# Patient Record
Sex: MALE | Race: WHITE | Employment: FULL TIME | URBAN - METROPOLITAN AREA
[De-identification: names, ages, dates, MRNs, and addresses within clinical notes are randomized per-mention and may not be internally consistent; named-entity substitution may affect disease eponyms.]

---

## 2019-05-22 ENCOUNTER — OFFICE VISIT (OUTPATIENT)
Dept: INTERNAL MEDICINE | Facility: CLINIC | Age: 19
End: 2019-05-22
Payer: COMMERCIAL

## 2019-05-22 VITALS
HEART RATE: 96 BPM | OXYGEN SATURATION: 99 % | SYSTOLIC BLOOD PRESSURE: 119 MMHG | WEIGHT: 123 LBS | HEIGHT: 73 IN | RESPIRATION RATE: 18 BRPM | TEMPERATURE: 98.2 F | BODY MASS INDEX: 16.3 KG/M2 | DIASTOLIC BLOOD PRESSURE: 74 MMHG

## 2019-05-22 DIAGNOSIS — Z00.00 ROUTINE HISTORY AND PHYSICAL EXAMINATION OF ADULT: Primary | ICD-10-CM

## 2019-05-22 DIAGNOSIS — F41.9 ANXIETY: ICD-10-CM

## 2019-05-22 LAB
BASOPHILS # BLD AUTO: 0 10E9/L (ref 0–0.2)
BASOPHILS NFR BLD AUTO: 0.2 %
DIFFERENTIAL METHOD BLD: NORMAL
EOSINOPHIL # BLD AUTO: 0 10E9/L (ref 0–0.7)
EOSINOPHIL NFR BLD AUTO: 0.3 %
ERYTHROCYTE [DISTWIDTH] IN BLOOD BY AUTOMATED COUNT: 12.1 % (ref 10–15)
HCT VFR BLD AUTO: 45.6 % (ref 40–53)
HGB BLD-MCNC: 15.6 G/DL (ref 13.3–17.7)
LYMPHOCYTES # BLD AUTO: 1.8 10E9/L (ref 0.8–5.3)
LYMPHOCYTES NFR BLD AUTO: 28.1 %
MCH RBC QN AUTO: 29 PG (ref 26.5–33)
MCHC RBC AUTO-ENTMCNC: 34.2 G/DL (ref 31.5–36.5)
MCV RBC AUTO: 85 FL (ref 78–100)
MONOCYTES # BLD AUTO: 0.4 10E9/L (ref 0–1.3)
MONOCYTES NFR BLD AUTO: 6.5 %
NEUTROPHILS # BLD AUTO: 4.2 10E9/L (ref 1.6–8.3)
NEUTROPHILS NFR BLD AUTO: 64.9 %
PLATELET # BLD AUTO: 189 10E9/L (ref 150–450)
RBC # BLD AUTO: 5.38 10E12/L (ref 4.4–5.9)
WBC # BLD AUTO: 6.5 10E9/L (ref 4–11)

## 2019-05-22 PROCEDURE — 99395 PREV VISIT EST AGE 18-39: CPT | Performed by: FAMILY MEDICINE

## 2019-05-22 PROCEDURE — 84443 ASSAY THYROID STIM HORMONE: CPT | Performed by: FAMILY MEDICINE

## 2019-05-22 PROCEDURE — 93000 ELECTROCARDIOGRAM COMPLETE: CPT | Performed by: FAMILY MEDICINE

## 2019-05-22 PROCEDURE — 85025 COMPLETE CBC W/AUTO DIFF WBC: CPT | Performed by: FAMILY MEDICINE

## 2019-05-22 PROCEDURE — 36415 COLL VENOUS BLD VENIPUNCTURE: CPT | Performed by: FAMILY MEDICINE

## 2019-05-22 PROCEDURE — 80048 BASIC METABOLIC PNL TOTAL CA: CPT | Performed by: FAMILY MEDICINE

## 2019-05-22 PROCEDURE — 80061 LIPID PANEL: CPT | Performed by: FAMILY MEDICINE

## 2019-05-22 SDOH — HEALTH STABILITY: MENTAL HEALTH: HOW OFTEN DO YOU HAVE A DRINK CONTAINING ALCOHOL?: NEVER

## 2019-05-22 ASSESSMENT — ANXIETY QUESTIONNAIRES
2. NOT BEING ABLE TO STOP OR CONTROL WORRYING: NOT AT ALL
IF YOU CHECKED OFF ANY PROBLEMS ON THIS QUESTIONNAIRE, HOW DIFFICULT HAVE THESE PROBLEMS MADE IT FOR YOU TO DO YOUR WORK, TAKE CARE OF THINGS AT HOME, OR GET ALONG WITH OTHER PEOPLE: NOT DIFFICULT AT ALL
6. BECOMING EASILY ANNOYED OR IRRITABLE: SEVERAL DAYS
GAD7 TOTAL SCORE: 5
5. BEING SO RESTLESS THAT IT IS HARD TO SIT STILL: SEVERAL DAYS
3. WORRYING TOO MUCH ABOUT DIFFERENT THINGS: SEVERAL DAYS
7. FEELING AFRAID AS IF SOMETHING AWFUL MIGHT HAPPEN: SEVERAL DAYS
1. FEELING NERVOUS, ANXIOUS, OR ON EDGE: SEVERAL DAYS

## 2019-05-22 ASSESSMENT — MIFFLIN-ST. JEOR: SCORE: 1613.86

## 2019-05-22 ASSESSMENT — ENCOUNTER SYMPTOMS
DIZZINESS: 0
WEAKNESS: 0
HEADACHES: 0
HEMATURIA: 0
HEMATOCHEZIA: 0
DIARRHEA: 0
CHILLS: 0
HEARTBURN: 0
NERVOUS/ANXIOUS: 0
FREQUENCY: 0
SHORTNESS OF BREATH: 1
NAUSEA: 0
SORE THROAT: 0
EYE PAIN: 0
JOINT SWELLING: 0
CONSTIPATION: 0
PALPITATIONS: 1
ABDOMINAL PAIN: 1
ARTHRALGIAS: 0
PARESTHESIAS: 0
MYALGIAS: 0
FEVER: 0
DYSURIA: 0
COUGH: 1

## 2019-05-22 ASSESSMENT — PATIENT HEALTH QUESTIONNAIRE - PHQ9
5. POOR APPETITE OR OVEREATING: NOT AT ALL
10. IF YOU CHECKED OFF ANY PROBLEMS, HOW DIFFICULT HAVE THESE PROBLEMS MADE IT FOR YOU TO DO YOUR WORK, TAKE CARE OF THINGS AT HOME, OR GET ALONG WITH OTHER PEOPLE: NOT DIFFICULT AT ALL
SUM OF ALL RESPONSES TO PHQ QUESTIONS 1-9: 7
SUM OF ALL RESPONSES TO PHQ QUESTIONS 1-9: 7

## 2019-05-22 NOTE — PROGRESS NOTES
SUBJECTIVE:   CC: Kev Espinal is an 18 year old male who presents for preventative health visit.     Healthy Habits:     Getting at least 3 servings of Calcium per day:  NO    Bi-annual eye exam:  NO    Dental care twice a year:  NO    Sleep apnea or symptoms of sleep apnea:  None    Diet:  Breakfast skipped    Frequency of exercise:  None    Taking medications regularly:  Not Applicable    Medication side effects:  Not applicable    PHQ-2 Total Score: 3    Additional concerns today:  Yes      He wonders about anxiety spells - Had a couple of them on May 20.  Feels shaky, hot, chest tight, fast heart rate.  Duration of spells 15 to 30 minutes.  He has not described these on other days so it is a relatively new phenomenon.      Patient Active Problem List   Diagnosis     Scoliosis     Lordosis of thoracolumbar region           Today's PHQ-2 Score:   PHQ-2 ( 1999 Pfizer) 5/22/2019   Q1: Little interest or pleasure in doing things 2   Q2: Feeling down, depressed or hopeless 1   PHQ-2 Score 3   Q1: Little interest or pleasure in doing things More than half the days   Q2: Feeling down, depressed or hopeless Several days   PHQ-2 Score 3       Abuse: Current or Past(Physical, Sexual or Emotional)- No  Do you feel safe in your environment? Yes    Social History     Tobacco Use     Smoking status: Current Some Day Smoker     Smokeless tobacco: Never Used     Tobacco comment: No one in family smokes.   Substance Use Topics     Alcohol use: Never     Frequency: Never     If you drink alcohol do you typically have >3 drinks per day or >7 drinks per week? 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      Alcohol Use 5/22/2019   Prescreen: >3 drinks/day or >7 drinks/week? Not Applicable   No flowsheet data found.    Last PSA: No results found for: PSA    Reviewed orders with patient. Reviewed health maintenance and updated orders accordingly - Yes      Reviewed and updated as needed this visit by clinical staff  Tobacco   "Allergies  Meds  Med Hx  Surg Hx  Fam Hx  Soc Hx        Reviewed and updated as needed this visit by Provider            Review of Systems   Constitutional: Negative for chills and fever.   HENT: Negative for congestion, ear pain, hearing loss and sore throat.    Eyes: Negative for pain and visual disturbance.   Respiratory: Positive for cough and shortness of breath.    Cardiovascular: Positive for palpitations. Negative for chest pain and peripheral edema.   Gastrointestinal: Positive for abdominal pain. Negative for constipation, diarrhea, heartburn, hematochezia and nausea.   Genitourinary: Negative for discharge, dysuria, frequency, genital sores, hematuria, impotence and urgency.   Musculoskeletal: Negative for arthralgias, joint swelling and myalgias.   Skin: Negative for rash.   Neurological: Negative for dizziness, weakness, headaches and paresthesias.   Psychiatric/Behavioral: Negative for mood changes. The patient is not nervous/anxious.          OBJECTIVE:   /74 (BP Location: Right arm, Patient Position: Sitting, Cuff Size: Adult Regular)   Pulse 96   Temp 98.2  F (36.8  C) (Oral)   Resp 18   Ht 1.842 m (6' 0.5\")   Wt 55.8 kg (123 lb)   SpO2 99%   BMI 16.45 kg/m      Physical Exam  GENERAL: healthy, alert and no distress  EYES: Eyes grossly normal to inspection, PERRL and conjunctivae and sclerae normal  HENT: ear canals and TM's normal, nose and mouth without ulcers or lesions  NECK: no adenopathy, no asymmetry, masses, or scars and thyroid normal to palpation  RESP: lungs clear to auscultation - no rales, rhonchi or wheezes  CV: regular rate and rhythm, normal S1 S2, no S3 or S4, no murmur, click or rub, no peripheral edema and peripheral pulses strong  ABDOMEN: soft, nontender, no hepatosplenomegaly, no masses and bowel sounds normal   (male): normal male genitalia without lesions or urethral discharge, no hernia  MS: no gross musculoskeletal defects noted, no edema  SKIN: no " "suspicious lesions or rashes  NEURO: Normal strength and tone, mentation intact and speech normal  PSYCH: mentation appears normal, affect normal/bright    Diagnostic Test Results:  Labs reviewed in Epic      EKG:  NSR, rate 68  Conduction nl, axis nl  ST and T nl  No Q wave, no ectopy.  EKG is WNL, no previous.        ASSESSMENT/PLAN:   1. Routine history and physical examination of adult    - Basic metabolic panel  (Ca, Cl, CO2, Creat, Gluc, K, Na, BUN)  - CBC with platelets and differential  - TSH  - Lipid Profile (Chol, Trig, HDL, LDL calc)  - EKG 12-lead complete w/read - Clinics    2. Anxiety    Consider monitor if heart racing spells persist.    Could consider anti anxiety med if persists and no other obvious cause.    Patient advised to return for worsening or persistent symptoms.      COUNSELING:   Reviewed preventive health counseling, as reflected in patient instructions       Consider AAA screening for ages 65-75 and smoking history       Regular exercise       Healthy diet/nutrition       Vision screening    Estimated body mass index is 16.45 kg/m  as calculated from the following:    Height as of this encounter: 1.842 m (6' 0.5\").    Weight as of this encounter: 55.8 kg (123 lb).          reports that he has been smoking.  He has never used smokeless tobacco.      Counseling Resources:  ATP IV Guidelines  Pooled Cohorts Equation Calculator  FRAX Risk Assessment  ICSI Preventive Guidelines  Dietary Guidelines for Americans, 2010  USDA's MyPlate  ASA Prophylaxis  Lung CA Screening    Guero Flores MD  [facility]  Answers for HPI/ROS submitted by the patient on 5/22/2019   Annual Exam:  If you checked off any problems, how difficult have these problems made it for you to do your work, take care of things at home, or get along with other people?: Not difficult at all  PHQ9 TOTAL SCORE: 7    "

## 2019-05-22 NOTE — LETTER
May 22, 2019        Kev Espinal        EKG looks OK.    Lab workup in progress.    Could consider a heart monitor if racing heart spells continue.    Talked about anxiety.          Guero Flores MD on 5/22/2019 at 1:47 PM

## 2019-05-23 LAB
ANION GAP SERPL CALCULATED.3IONS-SCNC: 6 MMOL/L (ref 3–14)
BUN SERPL-MCNC: 16 MG/DL (ref 7–30)
CALCIUM SERPL-MCNC: 10 MG/DL (ref 8.5–10.1)
CHLORIDE SERPL-SCNC: 105 MMOL/L (ref 94–109)
CHOLEST SERPL-MCNC: 156 MG/DL
CO2 SERPL-SCNC: 26 MMOL/L (ref 20–32)
CREAT SERPL-MCNC: 0.88 MG/DL (ref 0.66–1.25)
GFR SERPL CREATININE-BSD FRML MDRD: >90 ML/MIN/{1.73_M2}
GLUCOSE SERPL-MCNC: 73 MG/DL (ref 70–99)
HDLC SERPL-MCNC: 52 MG/DL
LDLC SERPL CALC-MCNC: 88 MG/DL
NONHDLC SERPL-MCNC: 104 MG/DL
POTASSIUM SERPL-SCNC: 4 MMOL/L (ref 3.4–5.3)
SODIUM SERPL-SCNC: 137 MMOL/L (ref 133–144)
TRIGL SERPL-MCNC: 80 MG/DL
TSH SERPL DL<=0.005 MIU/L-ACNC: 1.49 MU/L (ref 0.4–4)

## 2019-05-23 ASSESSMENT — ANXIETY QUESTIONNAIRES: GAD7 TOTAL SCORE: 5

## 2019-06-20 ENCOUNTER — OFFICE VISIT (OUTPATIENT)
Dept: INTERNAL MEDICINE | Facility: CLINIC | Age: 19
End: 2019-06-20
Payer: COMMERCIAL

## 2019-06-20 VITALS
HEART RATE: 123 BPM | WEIGHT: 123.1 LBS | SYSTOLIC BLOOD PRESSURE: 118 MMHG | BODY MASS INDEX: 16.32 KG/M2 | HEIGHT: 73 IN | DIASTOLIC BLOOD PRESSURE: 62 MMHG | TEMPERATURE: 98.6 F | OXYGEN SATURATION: 97 % | RESPIRATION RATE: 12 BRPM

## 2019-06-20 DIAGNOSIS — F41.9 ANXIETY: Primary | ICD-10-CM

## 2019-06-20 PROCEDURE — 99213 OFFICE O/P EST LOW 20 MIN: CPT | Performed by: NURSE PRACTITIONER

## 2019-06-20 ASSESSMENT — MIFFLIN-ST. JEOR: SCORE: 1614.32

## 2019-06-20 NOTE — PROGRESS NOTES
Subjective     Kev Espinal is a 18 year old male who presents to clinic today for the following health issues:    HPI   Abnormal Mood Symptoms      Duration: past week    Description:  Depression: no  Anxiety: YES  Panic attacks: YES- wakes up in panic state     Accompanying signs and symptoms: see PHQ-9 and JUDI scores    History (similar episodes/previous evaluation): last month PE with normal EKG, labs.  UC last week normal CXR, recommended anxiety treatment    Precipitating or alleviating factors: None    Therapies tried and outcome: none        Patient Active Problem List   Diagnosis     Scoliosis     Lordosis of thoracolumbar region     History reviewed. No pertinent surgical history.    Social History     Tobacco Use     Smoking status: Former Smoker     Last attempt to quit: 2019     Years since quittin.0     Smokeless tobacco: Never Used     Tobacco comment: No one in family smokes.   Substance Use Topics     Alcohol use: Not Currently     Frequency: Never     Family History   Problem Relation Age of Onset     Cancer - colorectal Maternal Grandmother      Diabetes Paternal Grandmother      Asthma Sister          Current Outpatient Medications   Medication Sig Dispense Refill     sertraline (ZOLOFT) 50 MG tablet Take 1 tablet (50 mg) by mouth daily 90 tablet 1     BP Readings from Last 3 Encounters:   19 118/62   19 119/74   14 104/62 (14 %/ 29 %)*     *BP percentiles are based on the 2017 AAP Clinical Practice Guideline for boys    Wt Readings from Last 3 Encounters:   19 55.8 kg (123 lb 1.6 oz)   19 55.8 kg (123 lb)   14 54.5 kg (120 lb 3.2 oz) (29 %)*     * Growth percentiles are based on CDC (Boys, 2-20 Years) data.                    Reviewed and updated as needed this visit by Provider         Review of Systems   ROS COMP: CONSTITUTIONAL: NEGATIVE for fever, chills, change in weight  ENT/MOUTH: NEGATIVE for ear, mouth and throat problems  RESP:  "NEGATIVE for significant cough or SOB  CV: POSITIVE for chest pain/chest pressure and palpitations      Objective    /62   Pulse 123   Temp 98.6  F (37  C) (Oral)   Resp 12   Ht 1.842 m (6' 0.5\")   Wt 55.8 kg (123 lb 1.6 oz)   SpO2 97%   BMI 16.47 kg/m        Physical Exam   GENERAL: alert, no distress and tall, underweight  NECK: no adenopathy, no asymmetry, masses, or scars and thyroid normal to palpation  RESP: lungs clear to auscultation - no rales, rhonchi or wheezes  CV: regular rate and rhythm, normal S1 S2, no S3 or S4, no murmur, click or rub, no peripheral edema and peripheral pulses strong  PSYCH: mentation appears normal, tangential and anxious            Assessment & Plan       ICD-10-CM    1. Anxiety F41.9 sertraline (ZOLOFT) 50 MG tablet          F/u 1 month on med          Mary Cha NP  [facility]    "

## 2020-01-12 DIAGNOSIS — F41.9 ANXIETY: ICD-10-CM

## 2020-01-13 NOTE — TELEPHONE ENCOUNTER
Sertraline refill request   Last OV on 6/20/19    Takes for anxiety     Provider approval needed.     JUDI-7 SCORE 5/22/2019   Total Score 5

## 2020-01-13 NOTE — TELEPHONE ENCOUNTER
"Requested Prescriptions   Pending Prescriptions Disp Refills     sertraline (ZOLOFT) 50 MG tablet [Pharmacy Med Name: SERTRALINE 50MG TABLETS] 90 tablet 1     Sig: TAKE 1 TABLET(50 MG) BY MOUTH DAILY   Last Written Prescription Date:  06/20/2019  Last Fill Quantity: 90,  # refills: 01   Last office visit: 6/20/2019 with prescribing provider:     Future Office Visit:      SSRIs Protocol Passed - 1/12/2020  3:27 AM        Passed - Recent (12 mo) or future (30 days) visit within the authorizing provider's specialty     Patient has had an office visit with the authorizing provider or a provider within the authorizing providers department within the previous 12 mos or has a future within next 30 days. See \"Patient Info\" tab in inbasket, or \"Choose Columns\" in Meds & Orders section of the refill encounter.              Passed - Medication is active on med list        Passed - Patient is age 18 or older        "

## 2020-02-24 ENCOUNTER — TRANSFERRED RECORDS (OUTPATIENT)
Dept: HEALTH INFORMATION MANAGEMENT | Facility: CLINIC | Age: 20
End: 2020-02-24

## 2020-02-24 ENCOUNTER — HOSPITAL ENCOUNTER (EMERGENCY)
Facility: CLINIC | Age: 20
Discharge: HOME | End: 2020-02-24
Attending: EMERGENCY MEDICINE | Admitting: EMERGENCY MEDICINE
Payer: COMMERCIAL

## 2020-02-24 VITALS
RESPIRATION RATE: 16 BRPM | DIASTOLIC BLOOD PRESSURE: 80 MMHG | TEMPERATURE: 98.5 F | HEART RATE: 94 BPM | SYSTOLIC BLOOD PRESSURE: 144 MMHG | OXYGEN SATURATION: 100 %

## 2020-02-24 DIAGNOSIS — R07.89 CHEST WALL PAIN: ICD-10-CM

## 2020-02-24 DIAGNOSIS — R07.9 CHEST PAIN, UNSPECIFIED TYPE: ICD-10-CM

## 2020-02-24 LAB
ALBUMIN SERPL-MCNC: 4.5 G/DL (ref 3.4–5)
ALP SERPL-CCNC: 77 U/L (ref 40–150)
ALT SERPL W P-5'-P-CCNC: 23 U/L (ref 0–70)
ANION GAP SERPL CALCULATED.3IONS-SCNC: 5 MMOL/L (ref 3–14)
AST SERPL W P-5'-P-CCNC: 13 U/L (ref 0–45)
BASOPHILS # BLD AUTO: 0 10E9/L (ref 0–0.2)
BASOPHILS NFR BLD AUTO: 0.3 %
BILIRUB SERPL-MCNC: 0.7 MG/DL (ref 0.2–1.3)
BUN SERPL-MCNC: 8 MG/DL (ref 7–30)
CALCIUM SERPL-MCNC: 9.2 MG/DL (ref 8.5–10.1)
CHLORIDE SERPL-SCNC: 106 MMOL/L (ref 94–109)
CO2 SERPL-SCNC: 28 MMOL/L (ref 20–32)
CREAT SERPL-MCNC: 0.83 MG/DL (ref 0.66–1.25)
DIFFERENTIAL METHOD BLD: NORMAL
EOSINOPHIL # BLD AUTO: 0 10E9/L (ref 0–0.7)
EOSINOPHIL NFR BLD AUTO: 0.4 %
ERYTHROCYTE [DISTWIDTH] IN BLOOD BY AUTOMATED COUNT: 11.7 % (ref 10–15)
GFR SERPL CREATININE-BSD FRML MDRD: >90 ML/MIN/{1.73_M2}
GLUCOSE SERPL-MCNC: 85 MG/DL (ref 70–99)
HCT VFR BLD AUTO: 44.4 % (ref 40–53)
HGB BLD-MCNC: 15.3 G/DL (ref 13.3–17.7)
IMM GRANULOCYTES # BLD: 0 10E9/L (ref 0–0.4)
IMM GRANULOCYTES NFR BLD: 0.4 %
LYMPHOCYTES # BLD AUTO: 1.6 10E9/L (ref 0.8–5.3)
LYMPHOCYTES NFR BLD AUTO: 21.6 %
MCH RBC QN AUTO: 29.9 PG (ref 26.5–33)
MCHC RBC AUTO-ENTMCNC: 34.5 G/DL (ref 31.5–36.5)
MCV RBC AUTO: 87 FL (ref 78–100)
MONOCYTES # BLD AUTO: 0.5 10E9/L (ref 0–1.3)
MONOCYTES NFR BLD AUTO: 7 %
NEUTROPHILS # BLD AUTO: 5.2 10E9/L (ref 1.6–8.3)
NEUTROPHILS NFR BLD AUTO: 70.3 %
NRBC # BLD AUTO: 0 10*3/UL
NRBC BLD AUTO-RTO: 0 /100
PLATELET # BLD AUTO: 176 10E9/L (ref 150–450)
POTASSIUM SERPL-SCNC: 3.6 MMOL/L (ref 3.4–5.3)
PROT SERPL-MCNC: 7.9 G/DL (ref 6.8–8.8)
RBC # BLD AUTO: 5.12 10E12/L (ref 4.4–5.9)
SODIUM SERPL-SCNC: 139 MMOL/L (ref 133–144)
WBC # BLD AUTO: 7.4 10E9/L (ref 4–11)

## 2020-02-24 PROCEDURE — 99285 EMERGENCY DEPT VISIT HI MDM: CPT

## 2020-02-24 PROCEDURE — 85025 COMPLETE CBC W/AUTO DIFF WBC: CPT | Performed by: EMERGENCY MEDICINE

## 2020-02-24 PROCEDURE — 36415 COLL VENOUS BLD VENIPUNCTURE: CPT | Performed by: EMERGENCY MEDICINE

## 2020-02-24 PROCEDURE — 93005 ELECTROCARDIOGRAM TRACING: CPT

## 2020-02-24 PROCEDURE — 80053 COMPREHEN METABOLIC PANEL: CPT | Performed by: EMERGENCY MEDICINE

## 2020-02-24 PROCEDURE — 25000132 ZZH RX MED GY IP 250 OP 250 PS 637: Performed by: EMERGENCY MEDICINE

## 2020-02-24 RX ORDER — ACETAMINOPHEN 325 MG/1
975 TABLET ORAL ONCE
Status: COMPLETED | OUTPATIENT
Start: 2020-02-24 | End: 2020-02-24

## 2020-02-24 RX ADMIN — ACETAMINOPHEN 975 MG: 325 TABLET, FILM COATED ORAL at 16:49

## 2020-02-24 ASSESSMENT — ENCOUNTER SYMPTOMS
TROUBLE SWALLOWING: 0
SPEECH DIFFICULTY: 0
NUMBNESS: 1
ARTHRALGIAS: 1

## 2020-02-24 NOTE — ED AVS SNAPSHOT
[facility] Emergency Department  [address]  [facility] [address]  Phone:  [phone]  Fax:  [phone]                                    Kev Espinal   MRN: [id number]    Department:  [facility] Emergency Department   Date of Visit:  2/24/2020           After Visit Summary Signature Page    I have received my discharge instructions, and my questions have been answered. I have discussed any challenges I see with this plan with the nurse or doctor.    ..........................................................................................................................................  Patient/Patient Representative Signature      ..........................................................................................................................................  Patient Representative Print Name and Relationship to Patient    ..................................................               ................................................  Date                                   Time    ..........................................................................................................................................  Reviewed by Signature/Title    ...................................................              ..............................................  Date                                               Time          22EPIC Rev 08/18

## 2020-02-24 NOTE — ED PROVIDER NOTES
History     Chief Complaint:  Multiple complaints    HPI   Kev Espinal is a 19 year old male who presents to the emergency department today with multiple complaints. The patient reports chest pain on the left side, left ribs, and between the left shoulder blade and ribs for the last 8 months. He states the chest pain started after an anxiety attack he had in May. He stopped his Zoloft last month and has not noticed any change. He reports excessive burping for the last few months, face numbness for the last few months, and head numbness when he lays down. These symptoms all seem to be intermittent. He denies difficulty speaking, difficulty swallowing, balance problems. The patient sees Dr. Chery as PCP. He also saw Dr. Flores. The patient states he used to vape, but has since stopped. He has been drinking since he turned 19 in November. He used to use marijuana but does not anymore.     Cardiac/PE/DVT Risk Factors:  History of hypertension - negative   History of hyperlipidemia - negative   History of diabetes - negative   History of smoking - used to vape, denies smoking now  Personal history of PE/DVT - negative   Family history of PE/DVT - negative   Family history of heart complications - negative   Recent travel - negative   Recent surgery - negative   Other immobilizations - negative   Cancer - negative      Allergies:  Cats  Pollen Extract     Medications:    Zoloft      Past Medical History:    Scoliosis   Lordosis of thoracolumbar region     Past Surgical History:    History reviewed. No pertinent past surgical history.    Family History:    Grandmother - colorectal cancer, diabetes  Sister - asthma     Social History:  The patient was accompanied to the ED by father.  Smoking Status: Former  Smokeless Tobacco: Never  Alcohol Use: Yes   Marital Status:  Single    Review of Systems   HENT: Negative for trouble swallowing.    Cardiovascular: Positive for chest pain.   Musculoskeletal: Positive for  arthralgias (left rib and left shoulder pain). Negative for gait problem.   Neurological: Positive for numbness (face and head numbness). Negative for speech difficulty.   All other systems reviewed and are negative.        Physical Exam     Patient Vitals for the past 24 hrs:   BP Temp Temp src Pulse Resp SpO2   02/24/20 1810 -- -- -- -- 16 --   02/24/20 1609 (!) 144/80 98.5  F (36.9  C) Temporal 94 16 100 %      Physical Exam  GEN: alert. Dis-sheveled.     HEAD: atraumatic    EYES: pupils reactive, extraocular muscles intact, conjunctivae normal    ENT: TMs normal as are EACs; nares patent; normal posterior pharynx and oral mucosa    NECK: no posterior midline tenderness, no meningeal signs, trachea midline     RESPIRATORY: no tachypnea, breath sounds clear to auscultation. Lungs are clear. Prominent AP dimension.      CVS: normal S1/S2, no murmurs/rubs/gallops. Chest pain reproducible over the left hemothorax in the 5th and 6th intracostal space in the anterior axillary line.    ABDOMEN: soft, nontender, no masses or organomegaly, no rebound, positive bowel sounds    MUSCULOSKELETAL: no deformities    SKIN: warm and dry, no acute rashes or ulceration, no erythema     NEURO: Normal neurologic exam. GCS 15, cranial nerves intact.  Motor and sensory- good tone; normal gait and coordination    LYMPH: no lymphadenopathy     Emergency Department Course   ECG:  Indication: chest pain  Completed at 1749.  Read at 1754.   Normal sinus rhythm with sinus arrhythmia  Normal ECG    Rate 72 bpm. CT interval 142. QRS duration 82. QT/QTc 370/405. P-R-T axes 29 59 61.      Laboratory:  Laboratory findings were communicated with the patient and family who voiced understanding of the findings.  CBC: AWNL (WBC 7.4, HGB 15.3, )   CMP: AWNL (Creatinine 0.83)      Interventions:  1649: Tylenol 975 mg PO     Emergency Department Course:  Nursing notes and vitals reviewed.  1630: I performed an exam of the patient as documented  above.   IV was inserted and blood was drawn for laboratory testing, results above.  1756: Patient rechecked and updated.    1748: Findings and plan explained to the Patient. Patient discharged home with instructions regarding supportive care, medications, and reasons to return. The importance of close follow-up was reviewed.    I personally reviewed the laboratory results with the Patient and answered all related questions prior to discharge.     Impression & Plan    Medical Decision Making:  Kev Espinal is a 19 year old male who [facility] with chest pain of 8 month duration, which is worse with movement, worse with pushing on it, and localizes to the left side, consistent with chest wall strain or contusion. He does have a noticeable thoracic kyphosis and at this point with discussion, the father states that he was diagnosed with spina bifida as a baby. This could be contributing to the intracostal pain and rib pain that he is having on the left. I have recommended at this point Ibuprofen 600 mg PO Q8 hours PRN and follow up with Dr. Chery regarding his symptomology. He may benefit from physical therapy and working on posture. Regarding his other more vague complains such as facial and head numbness, I recommend he follow up with PCP regarding that if it becomes persistent. It sounds like he has had this off and on for months but it has not been getting worse. He has a completely normal neurological exam here and he has had no reported other issues other than that. We did discuss possible cause of his increased eructation over the past few months due to stress. He has had no abdominal pain or reflux symptoms. I am recommended that he also consult with Dr. Chery if this becomes a worsening issue or more than an annoyance.     Diagnosis:    ICD-10-CM    1. Chest pain, unspecified type R07.9    2. Chest wall pain R07.89        Disposition:  discharged to home     Scribe Disclosure:  I,  Tata Browning MD, am serving as a scribe at 4:34 PM on 2/24/2020 to document services personally performed by Win Baig MD based on my observations and the provider's statements to me.    2/24/2020   [facility] EMERGENCY DEPARTMENT       Win Baig MD  02/24/20 5450

## 2020-02-24 NOTE — ED TRIAGE NOTES
Patient sent to the ED from clinic with 8 months of intermittent left sided chest pain. Additionally reports increased belching, lateral left rib pain and occasional face and head numbness.

## 2020-02-25 LAB — INTERPRETATION ECG - MUSE: NORMAL

## 2020-03-11 ENCOUNTER — OFFICE VISIT (OUTPATIENT)
Dept: INTERNAL MEDICINE | Facility: CLINIC | Age: 20
End: 2020-03-11
Payer: COMMERCIAL

## 2020-03-11 VITALS
WEIGHT: 142.5 LBS | OXYGEN SATURATION: 99 % | HEART RATE: 107 BPM | RESPIRATION RATE: 11 BRPM | SYSTOLIC BLOOD PRESSURE: 118 MMHG | HEIGHT: 73 IN | BODY MASS INDEX: 18.89 KG/M2 | TEMPERATURE: 98.2 F | DIASTOLIC BLOOD PRESSURE: 70 MMHG

## 2020-03-11 DIAGNOSIS — M94.0 COSTOCHONDRITIS: Primary | ICD-10-CM

## 2020-03-11 DIAGNOSIS — F41.9 ANXIETY: ICD-10-CM

## 2020-03-11 PROCEDURE — 99213 OFFICE O/P EST LOW 20 MIN: CPT | Performed by: NURSE PRACTITIONER

## 2020-03-11 ASSESSMENT — MIFFLIN-ST. JEOR: SCORE: 1697.32

## 2020-03-11 NOTE — PROGRESS NOTES
Subjective     Kev Espinal is a 19 year old male who presents to clinic today for the following health issues:    HPI   ED/UC Followup:    Facility:  [facility]   Date of visit: 20  Reason for visit: chest pain chest wall pain   Current Status: still going on.  Denies SOB/ALDRIDGE/cough/wheeze.  No palpitations, CP.  Underlying anxiety, stopped zoloft as did not feel it was helpful           Patient Active Problem List   Diagnosis     Scoliosis     Lordosis of thoracolumbar region     History reviewed. No pertinent surgical history.    Social History     Tobacco Use     Smoking status: Former Smoker     Last attempt to quit: 2019     Years since quittin.7     Smokeless tobacco: Never Used     Tobacco comment: No one in family smokes.   Substance Use Topics     Alcohol use: Yes     Frequency: Never     Family History   Problem Relation Age of Onset     Cancer - colorectal Maternal Grandmother      Diabetes Paternal Grandmother      Asthma Sister          Current Outpatient Medications   Medication Sig Dispense Refill     Cholecalciferol (VITAMIN D3 PO) Take by mouth daily       sertraline (ZOLOFT) 50 MG tablet TAKE 1 TABLET(50 MG) BY MOUTH DAILY (Patient not taking: Reported on 3/11/2020) 90 tablet 1     BP Readings from Last 3 Encounters:   20 118/70   20 (!) 144/80   19 118/62    Wt Readings from Last 3 Encounters:   20 64.6 kg (142 lb 8 oz)   19 55.8 kg (123 lb 1.6 oz)   19 55.8 kg (123 lb)                    Reviewed and updated as needed this visit by Provider         Review of Systems   ROS COMP: CONSTITUTIONAL: NEGATIVE for fever, chills, change in weight  ENT/MOUTH: NEGATIVE for ear, mouth and throat problems  RESP: NEGATIVE for significant cough or SOB  CV: NEGATIVE for chest pain, palpitations or peripheral edema  GI: NEGATIVE for nausea, abdominal pain, heartburn, or change in bowel habits  PSYCHIATRIC: POSITIVE foranxiety      Objective    /70   Pulse 107   " Temp 98.2  F (36.8  C) (Oral)   Resp 11   Ht 1.842 m (6' 0.5\")   Wt 64.6 kg (142 lb 8 oz)   SpO2 99%   BMI 19.06 kg/m      Physical Exam   GENERAL: healthy, alert and no distress  NECK: no adenopathy, no asymmetry, masses, or scars and thyroid normal to palpation  RESP: lungs clear to auscultation - no rales, rhonchi or wheezes  CV: regular rate and rhythm, normal S1 S2, no S3 or S4, no murmur, click or rub, no peripheral edema and peripheral pulses strong  CV: no L chest wall pain onpalpation  ABDOMEN: soft, nontender, no hepatosplenomegaly, no masses and bowel sounds normal  PSYCH: mentation appears normal and affect normal/bright            Assessment & Plan     (M94.0) Costochondritis  (primary encounter diagnosis)  Comment:   Plan: NSAIDs, heat    (F41.9) Anxiety  Comment:   Plan:  Consider restart zoloft             Mary Cha NP  [facility]    "

## 2020-07-03 ENCOUNTER — HOSPITAL ENCOUNTER (EMERGENCY)
Facility: CLINIC | Age: 20
Discharge: HOME | End: 2020-07-03
Attending: EMERGENCY MEDICINE | Admitting: EMERGENCY MEDICINE
Payer: COMMERCIAL

## 2020-07-03 ENCOUNTER — APPOINTMENT (OUTPATIENT)
Dept: GENERAL RADIOLOGY | Facility: CLINIC | Age: 20
End: 2020-07-03
Attending: EMERGENCY MEDICINE
Payer: COMMERCIAL

## 2020-07-03 VITALS
HEART RATE: 70 BPM | TEMPERATURE: 98 F | RESPIRATION RATE: 18 BRPM | DIASTOLIC BLOOD PRESSURE: 79 MMHG | OXYGEN SATURATION: 100 % | SYSTOLIC BLOOD PRESSURE: 133 MMHG

## 2020-07-03 DIAGNOSIS — R07.9 CHEST PAIN, UNSPECIFIED TYPE: ICD-10-CM

## 2020-07-03 LAB
ANION GAP SERPL CALCULATED.3IONS-SCNC: 6 MMOL/L (ref 3–14)
BASOPHILS # BLD AUTO: 0 10E9/L (ref 0–0.2)
BASOPHILS NFR BLD AUTO: 0.3 %
BUN SERPL-MCNC: 9 MG/DL (ref 7–30)
CALCIUM SERPL-MCNC: 8.9 MG/DL (ref 8.5–10.1)
CHLORIDE SERPL-SCNC: 107 MMOL/L (ref 94–109)
CO2 SERPL-SCNC: 27 MMOL/L (ref 20–32)
CREAT SERPL-MCNC: 0.82 MG/DL (ref 0.66–1.25)
DIFFERENTIAL METHOD BLD: NORMAL
EOSINOPHIL # BLD AUTO: 0 10E9/L (ref 0–0.7)
EOSINOPHIL NFR BLD AUTO: 0.3 %
ERYTHROCYTE [DISTWIDTH] IN BLOOD BY AUTOMATED COUNT: 11.5 % (ref 10–15)
GFR SERPL CREATININE-BSD FRML MDRD: >90 ML/MIN/{1.73_M2}
GLUCOSE SERPL-MCNC: 103 MG/DL (ref 70–99)
HCT VFR BLD AUTO: 45.4 % (ref 40–53)
HGB BLD-MCNC: 15.2 G/DL (ref 13.3–17.7)
IMM GRANULOCYTES # BLD: 0 10E9/L (ref 0–0.4)
IMM GRANULOCYTES NFR BLD: 0.3 %
LYMPHOCYTES # BLD AUTO: 2 10E9/L (ref 0.8–5.3)
LYMPHOCYTES NFR BLD AUTO: 23 %
MCH RBC QN AUTO: 29.5 PG (ref 26.5–33)
MCHC RBC AUTO-ENTMCNC: 33.5 G/DL (ref 31.5–36.5)
MCV RBC AUTO: 88 FL (ref 78–100)
MONOCYTES # BLD AUTO: 0.6 10E9/L (ref 0–1.3)
MONOCYTES NFR BLD AUTO: 6.9 %
NEUTROPHILS # BLD AUTO: 6 10E9/L (ref 1.6–8.3)
NEUTROPHILS NFR BLD AUTO: 69.2 %
NRBC # BLD AUTO: 0 10*3/UL
NRBC BLD AUTO-RTO: 0 /100
PLATELET # BLD AUTO: 182 10E9/L (ref 150–450)
POTASSIUM SERPL-SCNC: 3.4 MMOL/L (ref 3.4–5.3)
RBC # BLD AUTO: 5.16 10E12/L (ref 4.4–5.9)
SODIUM SERPL-SCNC: 140 MMOL/L (ref 133–144)
TROPONIN I SERPL-MCNC: <0.015 UG/L (ref 0–0.04)
WBC # BLD AUTO: 8.6 10E9/L (ref 4–11)

## 2020-07-03 PROCEDURE — 80048 BASIC METABOLIC PNL TOTAL CA: CPT | Performed by: EMERGENCY MEDICINE

## 2020-07-03 PROCEDURE — U0003 INFECTIOUS AGENT DETECTION BY NUCLEIC ACID (DNA OR RNA); SEVERE ACUTE RESPIRATORY SYNDROME CORONAVIRUS 2 (SARS-COV-2) (CORONAVIRUS DISEASE [COVID-19]), AMPLIFIED PROBE TECHNIQUE, MAKING USE OF HIGH THROUGHPUT TECHNOLOGIES AS DESCRIBED BY CMS-2020-01-R: HCPCS | Performed by: EMERGENCY MEDICINE

## 2020-07-03 PROCEDURE — 93005 ELECTROCARDIOGRAM TRACING: CPT

## 2020-07-03 PROCEDURE — 99285 EMERGENCY DEPT VISIT HI MDM: CPT

## 2020-07-03 PROCEDURE — 85025 COMPLETE CBC W/AUTO DIFF WBC: CPT | Performed by: EMERGENCY MEDICINE

## 2020-07-03 PROCEDURE — 84484 ASSAY OF TROPONIN QUANT: CPT | Performed by: EMERGENCY MEDICINE

## 2020-07-03 PROCEDURE — 71045 X-RAY EXAM CHEST 1 VIEW: CPT

## 2020-07-03 PROCEDURE — C9803 HOPD COVID-19 SPEC COLLECT: HCPCS

## 2020-07-03 NOTE — ED TRIAGE NOTES
2 hours ago left arm began getting tingly, became nauseated and got a headache. Also reports a discomfort left side of his upper back and chest. Denies SOB or vomiting.

## 2020-07-03 NOTE — LETTER
July 4, 2020        Kev Espinal  [address] [place] [address]    This letter provides a written record that you were tested for COVID-19 on 7/3/20.       Your result was negative. This means that we didn t find the virus that causes COVID-19 in your sample. A test may show negative when you do actually have the virus. This can happen when the virus is in the early stages of infection, before you feel illness symptoms.    If you have symptoms   Stay home and away from others (self-isolate) until you meet ALL of the guidelines below:    You ve had no fever--and no medicine that reduces fever--for 3 full days (72 hours). And      Your other symptoms have gotten better. For example, your cough or breathing has improved. And     At least 10 days have passed since your symptoms started.    During this time:    Stay home. Don t go to work, school or anywhere else.     Stay in your own room, including for meals. Use your own bathroom if you can.    Stay away from others in your home. No hugging, kissing or shaking hands. No visitors.    Clean  high touch  surfaces often (doorknobs, counters, handles, etc.). Use a household cleaning spray or wipes. You can find a full list on the EPA website at www.epa.gov/pesticide-registration/list-n-disinfectants-use-against-sars-cov-2.    Cover your mouth and nose with a mask, tissue or washcloth to avoid spreading germs.    Wash your hands and face often with soap and water.    Going back to work  Check with your employer for any guidelines to follow for going back to work.    Employers: This document serves as formal notice that your employee tested negative for COVID-19, as of the testing date shown above.

## 2020-07-03 NOTE — ED PROVIDER NOTES
History     Chief Complaint:    Chest Pain    HPI   Kev Espinal is a 19 year old male with history of anxiety off medications and atypical chest pain who presents for evaluation of left arm paresthesias, sharp left-sided chest pain, nausea, and dizziness since 5 AM this morning.  He notes that he had insomnia all night, began to feel somewhat anxious, and symptoms began.  He has had symptoms such as this multiple times in the past with negative work-ups.  Of note, he does endorse some fatigue and mild dyspnea associated with the above.  He is not aware of any COVID-19 sick contacts and denies any cough, fever, or other acute concerns.  Finally, he is typically on Prilosec but has been off this until he restarted it several days ago does endorse some GERD symptoms.    Allergies:  No known drug allergies     Medications:    Cholecalciferol  Sertraline    Past Medical History:    Scoliosis  Lordosis of thoracolumbar region    Past Surgical History:    History reviewed. No pertinent surgical history.    Family History:    Asthma    Social History:  Smoking status- former smoker  Alcohol use- yes  Drug use- yes  Marital Status:  Single [1]     Review of Systems  CV: + as per above  Resp: + as per above  Neuro: + as per above  GI: + as per above  All other systems reviewed and negative      Physical Exam     Patient Vitals for the past 24 hrs:   BP Temp Pulse Heart Rate Resp SpO2   07/03/20 0710 -- 98  F (36.7  C) -- -- -- --   07/03/20 0706 133/79 -- 70 70 18 100 %       Physical Exam  Constitutional: Alert, attentive  HENT:    Nose: Nose normal.    Mouth/Throat: Oropharynx is clear, mucous membranes are moist   Eyes: EOM are normal.   CV: regular rate and rhythm; no murmurs, rubs or gallups  Chest: Effort normal and breath sounds normal.   GI:  There is no tenderness. No distension. Normal bowel sounds  MSK: Normal range of motion.   Neurological: Alert, attentive  Skin: Skin is warm and dry.      Emergency  Department Course     ECG (07:19:52):  Rate 65 bpm. MT interval 124. QRS duration 82. QT/QTc 378/393. P-R-T axes -10 71 70. Sinus rhythm, normal ECG, when compared with ECG of 24-Feb-2020 17:49, no significant change was found. Interpreted at 0945 by Gus Tavares MD.    Imaging:  Radiology findings were communicated with the patient who voiced understanding of the findings.    XR Chest Port 1 View:  No acute pulmonary disease.    As read by Radiology.    Laboratory:  Laboratory findings were communicated with the patient who voiced understanding of the findings.    CBC: WNL (WBC 8.6, HGB 15.2, )  BMP: glucose 103 (H) o/w WNL (Creatinine 0.82)  Troponin (Collected 0740): <0.015    Symptomatic COVID-19 Virus by PCR: pending    Emergency Department Course:  Past medical records, nursing notes, and vitals reviewed.    0718 I performed an exam of the patient as documented above.      EKG obtained in the ED, see results above.      IV was inserted and blood was drawn for laboratory testing, results above.     The patient was sent for a XR chest while in the emergency department, results above.     0858 I rechecked the patient and discussed the results of his workup thus far.     Findings and plan explained to the Patient. Patient discharged home with instructions regarding supportive care, medications, and reasons to return. The importance of close follow-up was reviewed.       Impression & Plan     Medical Decision Making:  This is a 19-year-old male with history of anxiety disorder off medications who presents for evaluation of atypical chest pain, similar to previous.  Given symptoms are very atypical, his screening EKG and troponin strongly suggest against AMI and no additional testing is indicated.  He is PERC negative, essentially ruling out PE.  Chest x-ray shows no widened mediastinum, pneumothorax, or pneumonia.  COVID19 testing is pending although he appears low risk at this time.  Plan primary  care follow-up in 2 to 3 days, continuation of his H2 blocker for possible GERD contribution to this syndrome, and strict return precautions for worse pain, shortness of breath, or any other concerns.    Diagnosis:    ICD-10-CM   1. Chest pain, unspecified type  R07.9       Disposition:  Discharged to home.    Scribe Disclosure:  I, Tiffanie Glasgow, am serving as a scribe at 7:16 AM on 7/3/2020 to document services personally performed by Gus Tavares MD based on my observations and the provider's statements to me.          Gus Tavares MD  07/03/20 1017

## 2020-07-03 NOTE — ED AVS SNAPSHOT
[facility]  [address]  [facility] [address]  Phone:  [phone]  Fax:  [phone]                                    Kev Espinal   MRN: [id number]    Department:  [facility]   Date of Visit:  7/3/2020           After Visit Summary Signature Page    I have received my discharge instructions, and my questions have been answered. I have discussed any challenges I see with this plan with the nurse or doctor.    ..........................................................................................................................................  Patient/Patient Representative Signature      ..........................................................................................................................................  Patient Representative Print Name and Relationship to Patient    ..................................................               ................................................  Date                                   Time    ..........................................................................................................................................  Reviewed by Signature/Title    ...................................................              ..............................................  Date                                               Time          22EPIC Rev 08/18

## 2020-07-03 NOTE — DISCHARGE INSTRUCTIONS
Discharge Instructions  Chest Pain    You have been seen today for chest pain or discomfort.  At this time, your doctor has found no signs that your chest pain is due to a serious or life-threatening condition, (or you have declined more testing and/or admission to the hospital). However, sometimes there is a serious problem that does not show up right away. Your evaluation today may not be complete and you may need further testing and evaluation. You need to follow-up with your regular doctor within 3 days.    Return to the Emergency Department if:    Your chest pain changes, gets worse, starts to happen more often, or comes with less activity.  You are short of breath.  You get very weak or tired.  You pass out or faint.  You have any new symptoms, like fever, cough, numb legs, or you cough up blood  You have anything else that worries you.    Until you follow-up with your regular doctor please do the following:    If you have questions, contact your regular doctor.    If your doctor today has told you to follow-up with your regular doctor, it is very important that you make an appointment with your clinic and go to the appointment.  If you do not follow-up with your primary doctor, it may result in missing an important development which could result in permanent injury or disability and/or lasting pain.  If there is any problem keeping your appointment, call your doctor or return to the Emergency Department.      Remember that you can always come back to the Emergency Department if you are not able to see your regular doctor in the amount of time listed above, if you get any new symptoms, or if there is anything that worries you.

## 2020-07-04 LAB
INTERPRETATION ECG - MUSE: NORMAL
SARS-COV-2 RNA SPEC QL NAA+PROBE: NOT DETECTED
SPECIMEN SOURCE: NORMAL

## 2020-07-23 ENCOUNTER — NURSE TRIAGE (OUTPATIENT)
Dept: INTERNAL MEDICINE | Facility: CLINIC | Age: 20
End: 2020-07-23

## 2020-07-23 NOTE — TELEPHONE ENCOUNTER
Patient calling stating he would like to schedule an appointment as he has been having difficulty getting a full breath in for the past week. Patient denies shortness of breath.  Patient denied any cough, rash, fever, diaphoresis, radiating chest pain, or any other symptoms.  Patient had a negative Covid test on 7/3/20.  Patient stated he has been having mid sternal chest pain and was recently at the ED (7/3/20) and with left sided chest pain.  EKG and troponin were negative at that time. Patient stated he has been burping a lot and has been taking a medication for that, but continues to be burping a lot.  Patient wanting to be seen today or tomorrow.  No appointments available.  Advised patient to go to the .  Patient verbalized understanding.

## 2020-07-23 NOTE — TELEPHONE ENCOUNTER
"    Additional Information    Negative: Breathing stopped and hasn't returned    Negative: Choking on something    Negative: SEVERE difficulty breathing (e.g., struggling for each breath, speaks in single words, pulse > 120)    Negative: Bluish (or gray) lips or face    Negative: Difficult to awaken or acting confused (e.g., disoriented, slurred speech)    Negative: Passed out (i.e., fainted, collapsed and was not responding)    Negative: Wheezing started suddenly after medicine, an allergic food, or bee sting    Negative: Stridor    Negative: Slow, shallow and weak breathing    Negative: Sounds like a life-threatening emergency to the triager    Negative: Chest pain    Negative: Wheezing (high pitched whistling sound) and previous asthma attacks or use of asthma medicines    Negative: Difficulty breathing and only present when coughing    Negative: Difficulty breathing and only from stuffy or runny nose    Negative: MODERATE difficulty breathing (e.g., speaks in phrases, SOB even at rest, pulse 100-120) of new onset or worse than normal    Negative: Wheezing can be heard across the room    Negative: Drooling or spitting out saliva (because can't swallow)    Negative: Any history of prior \"blood clot\" in leg or lungs    Negative: Recent illness requiring prolonged bedrest (i.e., immobilization)    Negative: Hip or leg fracture in past 2 months (e.g., or had cast on leg or ankle)    Negative: Major surgery in the past month    Negative: Recent long-distance travel with prolonged time in car, bus, plane, or train (i.e., within past 2 weeks; 6 or  more hours duration)    Negative: Extra heart beats OR irregular heart beating   (i.e., \"palpitations\")    Negative: Fever > 103 F (39.4 C)    Negative: Fever > 101 F (38.3 C) and over 60 years of age    Negative: Fever > 100.0 F (37.8 C) and bedridden (e.g., nursing home patient, stroke, chronic illness, recovering from surgery)    Negative: Fever > 100.0 F (37.8 C) and " "diabetes mellitus or weak immune system (e.g., HIV positive, cancer chemo, splenectomy, organ transplant, chronic steroids)    Negative: Periods where breathing stops and then resumes normally and bedridden (e.g., nursing home patient, CVA)    Negative: Pregnant or postpartum (from 0 to 6 weeks after delivery)    Negative: Patient sounds very sick or weak to the triager    MILD difficulty breathing (e.g., minimal/no SOB at rest, SOB with walking, pulse < 100) of new onset or worse than normal    Answer Assessment - Initial Assessment Questions  1. RESPIRATORY STATUS: \"Describe your breathing?\" (e.g., wheezing, shortness of breath, unable to speak, severe coughing)       States not short of breath, but not able to get a full breath  2. ONSET: \"When did this breathing problem begin?\"       5 days ago  3. PATTERN \"Does the difficult breathing come and go, or has it been constant since it started?\"       intermittent  4. SEVERITY: \"How bad is your breathing?\" (e.g., mild, moderate, severe)     - MILD: No SOB at rest, mild SOB with walking, speaks normally in sentences, can lay down, no retractions, pulse < 100.     - MODERATE: SOB at rest, SOB with minimal exertion and prefers to sit, cannot lie down flat, speaks in phrases, mild retractions, audible wheezing, pulse 100-120.     - SEVERE: Very SOB at rest, speaks in single words, struggling to breathe, sitting hunched forward, retractions, pulse > 120       Mild to occ moderate  5. RECURRENT SYMPTOM: \"Have you had difficulty breathing before?\" If so, ask: \"When was the last time?\" and \"What happened that time?\"        No.  Has hx of left sided chest pain, this is more mid sternal  6. CARDIAC HISTORY: \"Do you have any history of heart disease?\" (e.g., heart attack, angina, bypass surgery, angioplasty)       no  7. LUNG HISTORY: \"Do you have any history of lung disease?\"  (e.g., pulmonary embolus, asthma, emphysema)      no  8. CAUSE: \"What do you think is causing the " "breathing problem?\"       unsure  9. OTHER SYMPTOMS: \"Do you have any other symptoms? (e.g., dizziness, runny nose, cough, chest pain, fever)     no  10. PREGNANCY: \"Is there any chance you are pregnant?\" \"When was your last menstrual period?\"        na  11. TRAVEL: \"Have you traveled out of the country in the last month?\" (e.g., travel history, exposures)        no    Protocols used: BREATHING DIFFICULTY-A-OH    "

## 2020-07-27 ENCOUNTER — OFFICE VISIT (OUTPATIENT)
Dept: INTERNAL MEDICINE | Facility: CLINIC | Age: 20
End: 2020-07-27
Payer: COMMERCIAL

## 2020-07-27 VITALS
WEIGHT: 137.3 LBS | TEMPERATURE: 98 F | RESPIRATION RATE: 20 BRPM | HEIGHT: 73 IN | DIASTOLIC BLOOD PRESSURE: 69 MMHG | HEART RATE: 96 BPM | BODY MASS INDEX: 18.2 KG/M2 | OXYGEN SATURATION: 96 % | SYSTOLIC BLOOD PRESSURE: 114 MMHG

## 2020-07-27 DIAGNOSIS — F41.9 ANXIETY: ICD-10-CM

## 2020-07-27 DIAGNOSIS — F32.0 MILD MAJOR DEPRESSION (H): Primary | ICD-10-CM

## 2020-07-27 DIAGNOSIS — R07.9 CHEST PAIN, UNSPECIFIED TYPE: ICD-10-CM

## 2020-07-27 PROCEDURE — 99214 OFFICE O/P EST MOD 30 MIN: CPT | Performed by: INTERNAL MEDICINE

## 2020-07-27 RX ORDER — FLUOXETINE 10 MG/1
CAPSULE ORAL
Qty: 60 CAPSULE | Refills: 0 | Status: SHIPPED | OUTPATIENT
Start: 2020-07-27 | End: 2020-08-06 | Stop reason: SINTOL

## 2020-07-27 RX ORDER — RIBOFLAVIN (VITAMIN B2) 100 MG
100 TABLET ORAL 3 TIMES DAILY
COMMUNITY

## 2020-07-27 ASSESSMENT — MIFFLIN-ST. JEOR: SCORE: 1673.73

## 2020-07-27 NOTE — NURSING NOTE
"/69 (BP Location: Left arm, Patient Position: Sitting, Cuff Size: Adult Regular)   Pulse 96   Temp 98  F (36.7  C) (Oral)   Ht 1.842 m (6' 0.5\")   Wt 62.3 kg (137 lb 4.8 oz)   BMI 18.37 kg/m      "

## 2020-07-27 NOTE — PATIENT INSTRUCTIONS
Call with an update on how you are doing as you are getting low on the prescription so we can decide if the dose should be increased or to reorder.     Call sooner if very bothersome side effects, if feeling any worse.

## 2020-07-27 NOTE — PROGRESS NOTES
Subjective     Kev Espinal is a 19 year old male who presents to clinic today for the following health issues:    HPI   1.  Complaints of chest tightness and shortness of breath: He has been having tightness across the center of his anterior chest, sometimes associated with some left posterior shoulder discomfort that started about a week ago.  He was seen at the ED on 7/3 with a little bit different left chest tightness and arm symptoms which resolved.  The tightness is intermittent, when it is present it seems like it is hard to get a deep breath or catch his breath but it is not really exertional dyspnea.  Occasionally he would have a little burning sensation.  It can last 10 to 30 minutes, will stay the same intensity when present.  During this time he is also been aware of a lot of belching.  He started taking Meprazole about a week or so ago and there is no burning in the chest now.  He is not sure the other tightness is really much better yet.  He has not had cough, fever, not awakening at night with shortness of breath, no orthopnea, no water brash.  He thinks the belching may make it feel a little bit better.  He has not been aware of wheezing.    The posterior pain at the left side often is with the chest tightness, occasionally can happen at other times.  It is not related to movement of the shoulder.    2.  Mood issues: He reports he has had some issues with feeling some anxiety in the past, was started on sertraline a year ago, was only on a low-dose of 50 mg which did not seem to help his symptoms at all, he also did have some side effects including feeling somewhat spacey and had odd sensations in his head so he stopped it after a couple months.  He not been on any other medications in the past.  He feels depression may be a little more of an issue now than anxiety symptoms.    Patient Active Problem List   Diagnosis     Scoliosis     Lordosis of thoracolumbar region     Current Outpatient  "Medications   Medication Sig Dispense Refill     Cholecalciferol (VITAMIN D3 PO) Take by mouth daily       vitamin C (ASCORBIC ACID) 100 MG tablet Take 100 mg by mouth 3 times daily        Social History     Tobacco Use     Smoking status: Former Smoker     Last attempt to quit: 2019     Years since quittin.1     Smokeless tobacco: Never Used     Tobacco comment: No one in family smokes.   Substance Use Topics     Alcohol use: Yes     Frequency: Never     Drug use: Yes     Types: Marijuana     Comment: rare         Reviewed and updated as needed this visit by Provider         Review of Systems   No fever, chills, cough, PND, orthopnea, nausea, vomiting, water brash, numbness or tingling in his arm      Objective    /69 (BP Location: Left arm, Patient Position: Sitting, Cuff Size: Adult Regular)   Pulse 96   Temp 98  F (36.7  C) (Oral)   Resp 20   Ht 1.842 m (6' 0.5\")   Wt 62.3 kg (137 lb 4.8 oz)   SpO2 96%   BMI 18.37 kg/m    Body mass index is 18.37 kg/m .  Physical Exam     Left posterior shoulder area: There is some mild muscle knots and tenderness  Lungs: Clear without wheezes or crackles  CV: Regular S1, S2 without murmurs, S3-S4  Chest wall: Some mild tenderness in the left pectoralis muscle but no significant tenderness of the anterior chest including the costochondral junctions.  Abdomen: Bowel sounds present, soft, nontender, no hepatosplenomegaly        PHQ 2019   PHQ-9 Total Score 7 7 10   Q9: Thoughts of better off dead/self-harm past 2 weeks Not at all Not at all Not at all     JUDI-7 SCORE 2019   Total Score 5 5           Assessment & Plan     1. Mild major depression (H)  He has some mild depressive symptoms, has increased from a year ago.  We discussed options and he is interested in trying medication.  Discussed options, suggest trial Prozac, advised to start with 10 mg and then increase to 20 and hold it there for a couple weeks, advised " to call for bothersome side effects, cautioned that any antidepressant could potentially cause suicidal thoughts and he should call immediately if he were to experience any of the symptoms.  Discussed the expected benefits of the medication and advised that if it does not seem to help, the dose could be increased or we could change medications.  - FLUoxetine (PROZAC) 10 MG capsule; 1 po daily x7 days, then 2 po daily  Dispense: 60 capsule; Refill: 0    2. Chest pain, unspecified type  Advised it is possible that his chest symptoms are due to acid reflux, reassured it is unlikely to be cardiac or pulmonary, particularly after the ED work-up earlier this month.  It does not seem to be chest wall though that is still possible.  Advised to continue the omeprazole, discussed avoiding things that could increase acid production.  Advised that belching is generally air that is swallowed and sometimes is an involuntary reaction to having some discomfort and he could use Gas-X for that.  By sometimes that is also part of the stress and anxiety reaction and the shortness of breath frequently can be related to that.  Hopefully will improve with treatment    3. Anxiety  Mild symptoms advised the medication should help this as well.       No follow-ups on file.    Alka Colon MD  [facility]

## 2020-07-27 NOTE — LETTER
My Depression Action Plan  Name: Kev Espinal   Date of Birth 2000  Date: 7/29/2020    My doctor: Mary Cha   My clinic: [place] [address]  [facility] [id number] [phone]          GREEN    ZONE   Good Control    What it looks like:     Things are going generally well. You have normal ups and downs. You may even feel depressed from time to time, but bad moods usually last less than a day.   What you need to do:  1. Continue to care for yourself (see self care plan)  2. Check your depression survival kit and update it as needed  3. Follow your physician s recommendations including any medication.  4. Do not stop taking medication unless you consult with your physician first.           YELLOW         ZONE Getting Worse    What it looks like:     Depression is starting to interfere with your life.     It may be hard to get out of bed; you may be starting to isolate yourself from others.    Symptoms of depression are starting to last most all day and this has happened for several days.     You may have suicidal thoughts but they are not constant.   What you need to do:     1. Call your care team. Your response to treatment will improve if you keep your care team informed of your progress. Yellow periods are signs an adjustment may need to be made.     2. Continue your self-care.  Just get dressed and ready for the day.  Don't give yourself time to talk yourself out of it.    3. Talk to someone in your support network.    4. Open up your Depression Self-Care Plan/Wellness Kit.           RED    ZONE Medical Alert - Get Help    What it looks like:     Depression is seriously interfering with your life.     You may experience these or other symptoms: You can t get out of bed most days, can t work or engage in other necessary activities, you have trouble taking care of basic hygiene, or basic responsibilities, thoughts of suicide or death that will not go  away, self-injurious behavior.     What you need to do:  1. Call your care team and request a same-day appointment. If they are not available (weekends or after hours) call your local crisis line, emergency room or 911.            Depression Self-Care Plan / Wellness Kit    Self-Care for Depression  Here s the deal. Your body and mind are really not as separate as most people think.  What you do and think affects how you feel and how you feel influences what you do and think. This means if you do things that people who feel good do, it will help you feel better.  Sometimes this is all it takes.  There is also a place for medication and therapy depending on how severe your depression is, so be sure to consult with your medical provider and/ or Behavioral Health Consultant if your symptoms are worsening or not improving.     In order to better manage my stress, I will:    Exercise  Get some form of exercise, every day. This will help reduce pain and release endorphins, the  feel good  chemicals in your brain. This is almost as good as taking antidepressants!  This is not the same as joining a gym and then never going! (they count on that by the way ) It can be as simple as just going for a walk or doing some gardening, anything that will get you moving.      Hygiene   Maintain good hygiene (get out of bed in the morning, make your bed, brush your teeth, take a shower, and get dressed like you were going to work, even if you are unemployed).  If your clothes don't fit try to get ones that do.    Diet  Strive to eat foods that are good for me, drink plenty of water, and avoid excessive sugar, caffeine, alcohol, and other mood-altering substances.  Some foods that are helpful in depression are: complex carbohydrates, B vitamins, flaxseed, fish or fish oil, fresh fruits and vegetables.    Psychotherapy  Agree to participate in Individual Therapy (if recommended).    Medication  If prescribed medications, I agree to take  them.  Missing doses can result in serious side effects.  I understand that drinking alcohol, or other illicit drug use, may cause potential side effects.  I will not stop my medication abruptly without first discussing it with my provider.    Staying Connected With Others  Stay in touch with my friends, family members, and my primary care provider/team.    Use your imagination  Be creative.  We all have a creative side; it doesn t matter if it s oil painting, sand castles, or mud pies! This will also kick up the endorphins.    Witness Beauty  (AKA stop and smell the roses) Take a look outside, even in mid-winter. Notice colors, textures. Watch the squirrels and birds.     Service to others  Be of service to others.  There is always someone else in need.  By helping others we can  get out of ourselves  and remember the really important things.  This also provides opportunities for practicing all the other parts of the program.    Humor  Laugh and be silly!  Adjust your TV habits for less news and crime-drama and more comedy.    Control your stress  Try breathing deep, massage therapy, biofeedback, and meditation. Find time to relax each day.     Crisis Text Line  http://www.crisistextline.org    The Crisis Text Line serves anyone, in any type of crisis, providing access to free, 24/7 support and information via the medium people already use and trust:    Here's how it works:  1.  Text 497-686 from anywhere in the [place], anytime, about any type of crisis.  2.  A live, trained Crisis Counselor receives the text and responds quickly.  3.  The volunteer Crisis Counselor will help you move from a 'hot moment to a cool moment'.    My support system    Clinic Contact:  Phone number:    Contact 1:  Phone number:    Contact 2:  Phone number:    Sabianist/:  Phone number:    Therapist:  Phone number:    Local crisis center:    Phone number:    Other community support:  Phone number:

## 2020-07-29 PROBLEM — F41.9 ANXIETY: Status: ACTIVE | Noted: 2020-07-29

## 2020-07-29 PROBLEM — F32.0 MILD MAJOR DEPRESSION (H): Status: ACTIVE | Noted: 2020-07-29

## 2020-07-29 ASSESSMENT — ANXIETY QUESTIONNAIRES
1. FEELING NERVOUS, ANXIOUS, OR ON EDGE: SEVERAL DAYS
6. BECOMING EASILY ANNOYED OR IRRITABLE: SEVERAL DAYS
3. WORRYING TOO MUCH ABOUT DIFFERENT THINGS: NOT AT ALL
5. BEING SO RESTLESS THAT IT IS HARD TO SIT STILL: NOT AT ALL
7. FEELING AFRAID AS IF SOMETHING AWFUL MIGHT HAPPEN: SEVERAL DAYS
GAD7 TOTAL SCORE: 5
2. NOT BEING ABLE TO STOP OR CONTROL WORRYING: SEVERAL DAYS

## 2020-07-29 ASSESSMENT — PATIENT HEALTH QUESTIONNAIRE - PHQ9
SUM OF ALL RESPONSES TO PHQ QUESTIONS 1-9: 10
5. POOR APPETITE OR OVEREATING: SEVERAL DAYS

## 2020-07-30 ASSESSMENT — ANXIETY QUESTIONNAIRES: GAD7 TOTAL SCORE: 5

## 2020-08-05 ENCOUNTER — TELEPHONE (OUTPATIENT)
Dept: INTERNAL MEDICINE | Facility: CLINIC | Age: 20
End: 2020-08-05

## 2020-08-05 DIAGNOSIS — F32.0 MILD MAJOR DEPRESSION (H): Primary | ICD-10-CM

## 2020-08-05 NOTE — TELEPHONE ENCOUNTER
Patient started taking Fluoxetine on 7/27/20 then started experiencing a racing heart on Friday or Saturday 7/30/20-8/1/20. He stopped taking medication on Saturday due to his symptoms but would like to know if there is another medication he can take or what he should do next. Call him at [phone] (home)

## 2020-08-06 RX ORDER — ESCITALOPRAM OXALATE 10 MG/1
10 TABLET ORAL DAILY
Qty: 30 TABLET | Refills: 1 | Status: SHIPPED | OUTPATIENT
Start: 2020-08-06 | End: 2021-09-17

## 2020-08-06 NOTE — TELEPHONE ENCOUNTER
Please call and advised that I sent a prescription for one called Lexapro or escitalopram.  I think this 1 would be much less likely to cause any of those side effects.  It is best taken at night as it sometimes can cause a little drowsiness.  Start at 10 mg or 1 tablet at bedtime and it could be increased after couple weeks if not helping.

## 2020-08-10 NOTE — TELEPHONE ENCOUNTER
Called patient and advised of primary care provider's message below. Patient verbalized understanding, will call back with further concerns.

## 2021-01-15 ENCOUNTER — HEALTH MAINTENANCE LETTER (OUTPATIENT)
Age: 21
End: 2021-01-15

## 2021-09-05 ENCOUNTER — HEALTH MAINTENANCE LETTER (OUTPATIENT)
Age: 21
End: 2021-09-05

## 2021-09-09 ENCOUNTER — NURSE TRIAGE (OUTPATIENT)
Dept: INTERNAL MEDICINE | Facility: CLINIC | Age: 21
End: 2021-09-09

## 2021-09-09 NOTE — TELEPHONE ENCOUNTER
S-(situation): patient states he had migraine  On Sunday morning. Also night of drinking etoh the night before  B-(background): only had one migraine in his life    A-(assessment): no fever, no vomiting patient has not been taking any otc meds. Wasn't sure if he should in case it was anything more serious    R-(recommendations): This writer encouraged patient to make annual physical appt. No meds at this time.  He will take tylenol and ibuprofen  Cool washcloth to forehead  Lie down in quiet place  No signs of stroke according to patient  Encouraged increase water intake  Patient verbalized understanding and will call if headache worsens      Reason for Disposition    Patient wants to be seen    Unexplained headache that is present > 24 hours    Headache is a chronic symptom (recurrent or ongoing AND lasting > 4 weeks)    Additional Information    Negative: Difficult to awaken or acting confused (e.g., disoriented, slurred speech)    Negative: Weakness of the face, arm or leg on one side of the body and new onset    Negative: Numbness of the face, arm or leg on one side of the body and new onset    Negative: Loss of speech or garbled speech and new onset    Negative: Passed out (i.e., fainted, collapsed and was not responding)    Negative: Sounds like a life-threatening emergency to the triager    Negative: Followed a head injury within last 3 days    Negative: Traumatic Brain Injury (TBI) is suspected    Negative: Sinus pain of forehead and yellow or green nasal discharge    Negative: Pregnant    Negative: Unable to walk without falling    Negative: Stiff neck (can't touch chin to chest)    Negative: Possibility of carbon monoxide exposure    Negative: SEVERE headache, states 'worst headache' of life    Negative: SEVERE headache, sudden-onset (i.e., reaching maximum intensity within seconds to 1 hour)    Negative: Severe pain in one eye    Negative: Loss of vision or double vision (Exception: same as prior  "migraines)    Negative: Patient sounds very sick or weak to the triager    Negative: Fever > 103 F (39.4 C)    Negative: Fever > 100.0 F (37.8 C) and has diabetes mellitus or a weak immune system (e.g., HIV positive, cancer chemotherapy, organ transplant, splenectomy, chronic steroids)    Negative: SEVERE headache (e.g., excruciating) and has had severe headaches before    Negative: SEVERE headache and not relieved by pain meds    Negative: SEVERE headache and vomiting    Negative: SEVERE headache and fever    Negative: New headache and weak immune system (e.g., HIV positive, cancer chemo, splenectomy, organ transplant, chronic steroids)    Negative: Fever present > 3 days (72 hours)    Negative: New headache and age > 50    Negative: Headache started during exertion (e.g., sex, strenuous exercise, heavy lifting)    Answer Assessment - Initial Assessment Questions  1. LOCATION: \"Where does it hurt?\"       In the back and around eyes  2. ONSET: \"When did the headache start?\" (Minutes, hours or days)       Since sunday  3. PATTERN: \"Does the pain come and go, or has it been constant since it started?\"      intermittent  4. SEVERITY: \"How bad is the pain?\" and \"What does it keep you from doing?\"  (e.g., Scale 1-10; mild, moderate, or severe)    - MILD (1-3): doesn't interfere with normal activities     - MODERATE (4-7): interferes with normal activities or awakens from sleep     - SEVERE (8-10): excruciating pain, unable to do any normal activities         4/10  5. RECURRENT SYMPTOM: \"Have you ever had headaches before?\" If so, ask: \"When was the last time?\" and \"What happened that time?\"       Pt gets fall allergies  6. CAUSE: \"What do you think is causing the headache?\"      Was drinking and has been trying to increase water intake  7. MIGRAINE: \"Have you been diagnosed with migraine headaches?\" If so, ask: \"Is this headache similar?\"       none  8. HEAD INJURY: \"Has there been any recent injury to the head?\"       " "no  9. OTHER SYMPTOMS: \"Do you have any other symptoms?\" (fever, stiff neck, eye pain, sore throat, cold symptoms)      no  10. PREGNANCY: \"Is there any chance you are pregnant?\" \"When was your last menstrual period?\"        n/a    Protocols used: HEADACHE-A-OH      "

## 2021-09-09 NOTE — TELEPHONE ENCOUNTER
Appointment made  Next 5 appointments (look out 90 days)    Sep 17, 2021 11:00 AM  SHORT with Guero Flores MD  [facility] ([facility] ) [address]  [facility] [id number] [phone]

## 2021-09-17 ENCOUNTER — OFFICE VISIT (OUTPATIENT)
Dept: INTERNAL MEDICINE | Facility: CLINIC | Age: 21
End: 2021-09-17
Payer: COMMERCIAL

## 2021-09-17 VITALS
DIASTOLIC BLOOD PRESSURE: 68 MMHG | WEIGHT: 129.5 LBS | HEART RATE: 88 BPM | TEMPERATURE: 96.9 F | HEIGHT: 73 IN | SYSTOLIC BLOOD PRESSURE: 122 MMHG | RESPIRATION RATE: 18 BRPM | OXYGEN SATURATION: 100 % | BODY MASS INDEX: 17.16 KG/M2

## 2021-09-17 DIAGNOSIS — Z00.00 ROUTINE HISTORY AND PHYSICAL EXAMINATION OF ADULT: Primary | ICD-10-CM

## 2021-09-17 PROCEDURE — 82947 ASSAY GLUCOSE BLOOD QUANT: CPT | Performed by: FAMILY MEDICINE

## 2021-09-17 PROCEDURE — 80061 LIPID PANEL: CPT | Performed by: FAMILY MEDICINE

## 2021-09-17 PROCEDURE — 36415 COLL VENOUS BLD VENIPUNCTURE: CPT | Performed by: FAMILY MEDICINE

## 2021-09-17 PROCEDURE — 99395 PREV VISIT EST AGE 18-39: CPT | Performed by: FAMILY MEDICINE

## 2021-09-17 ASSESSMENT — ENCOUNTER SYMPTOMS
CONSTIPATION: 0
HEADACHES: 0
CHILLS: 0
HEMATOCHEZIA: 0
FREQUENCY: 0
NAUSEA: 0
SHORTNESS OF BREATH: 0
EYE PAIN: 0
ARTHRALGIAS: 0
JOINT SWELLING: 0
PALPITATIONS: 0
ABDOMINAL PAIN: 0
NERVOUS/ANXIOUS: 0
SORE THROAT: 0
PARESTHESIAS: 0
DIZZINESS: 0
DIARRHEA: 0
WEAKNESS: 0
FEVER: 0
HEARTBURN: 0
DYSURIA: 0
COUGH: 0
HEMATURIA: 0
MYALGIAS: 0

## 2021-09-17 ASSESSMENT — MIFFLIN-ST. JEOR: SCORE: 1633.35

## 2021-09-17 NOTE — PROGRESS NOTES
SUBJECTIVE:   CC: Kev Espinal is an 20 year old male who presents for preventative health visit.     : Yes  Healthy Habits:     Getting at least 3 servings of Calcium per day:  NO    Bi-annual eye exam:  Yes    Dental care twice a year:  NO    Sleep apnea or symptoms of sleep apnea:  Sleep apnea    Diet:  Regular (no restrictions)    Frequency of exercise:  None    Taking medications regularly:  No    Medication side effects:  Other    PHQ-2 Total Score: 2    Additional concerns today:  No    20-year-old young man in for physical exam.  I actually have seen him once before.    He has some history of anxiety.  There are records of old prescriptions for sertraline, fluoxetine, escitalopram.  He is on none of them now.  He has been basically on no medication for the last year.  His anxiety is manageable.    Currently working in a [employer] capacity at [employer].    He did have one long lasting left parietal headache which lasted about a week and then diminished.    Sometimes feels his heart skip a beat but not consistently.      Today's PHQ-2 Score:   PHQ-2 (  [employer]) 3/11/2020   Q1: Little interest or pleasure in doing things 0   Q2: Feeling down, depressed or hopeless 0   PHQ-2 Score 0   Q1: Little interest or pleasure in doing things -   Q2: Feeling down, depressed or hopeless -   PHQ-2 Score -       Abuse: Current or Past(Physical, Sexual or Emotional)- No  Do you feel safe in your environment? Yes        Social History     Tobacco Use     Smoking status: Former Smoker     Quit date: 2019     Years since quittin.2     Smokeless tobacco: Never Used     Tobacco comment: No one in family smokes.   Substance Use Topics     Alcohol use: Yes         Alcohol Use 2019   Prescreen: >3 drinks/day or >7 drinks/week? Not Applicable   Prescreen: >3 drinks/day or >7 drinks/week? -       Last PSA: No results found for: PSA        Reviewed and updated as needed this visit by clinical staff              "    Reviewed and updated as needed this visit by Provider                    Review of Systems   Constitutional: Negative for chills and fever.   HENT: Negative for congestion, ear pain, hearing loss and sore throat.    Eyes: Negative for pain and visual disturbance.   Respiratory: Negative for cough and shortness of breath.    Cardiovascular: Negative for chest pain, palpitations and peripheral edema.   Gastrointestinal: Negative for abdominal pain, constipation, diarrhea, heartburn, hematochezia and nausea.   Genitourinary: Negative for dysuria, frequency, genital sores, hematuria and urgency.   Musculoskeletal: Negative for arthralgias, joint swelling and myalgias.   Skin: Negative for rash.   Neurological: Negative for dizziness, weakness, headaches and paresthesias.   Psychiatric/Behavioral: Negative for mood changes. The patient is not nervous/anxious.          OBJECTIVE:   /68   Pulse 88   Temp 96.9  F (36.1  C) (Tympanic)   Resp 18   Ht 1.842 m (6' 0.5\")   Wt 58.7 kg (129 lb 8 oz)   SpO2 100%   BMI 17.32 kg/m      Physical Exam  GENERAL: healthy, alert and no distress  EYES: Eyes grossly normal to inspection, PERRL and conjunctivae and sclerae normal  HENT: ear canals and TM's normal, nose and mouth without ulcers or lesions  NECK: no adenopathy, no asymmetry, masses, or scars and thyroid normal to palpation  RESP: lungs clear to auscultation - no rales, rhonchi or wheezes  CV: regular rate and rhythm, normal S1 S2, no S3 or S4, no murmur, click or rub, no peripheral edema and peripheral pulses strong  ABDOMEN: soft, nontender, no hepatosplenomegaly, no masses and bowel sounds normal  MS: no gross musculoskeletal defects noted, no edema  SKIN: no suspicious lesions or rashes  NEURO: Normal strength and tone, mentation intact and speech normal  PSYCH: mentation appears normal, affect normal/bright    Diagnostic Test Results:  Labs reviewed in Epic    ASSESSMENT/PLAN:   (Z00.00) Routine history " "and physical examination of adult  (primary encounter diagnosis)  Comment:   Plan: Glucose, Lipid Profile (Chol, Trig, HDL, LDL         calc)              Patient has been advised of split billing requirements and indicates understanding: No  COUNSELING:   Reviewed preventive health counseling, as reflected in patient instructions       Regular exercise       Healthy diet/nutrition    Estimated body mass index is 18.37 kg/m  as calculated from the following:    Height as of 7/27/20: 1.842 m (6' 0.5\").    Weight as of 7/27/20: 62.3 kg (137 lb 4.8 oz).         He reports that he quit smoking about 2 years ago. He has never used smokeless tobacco.      Counseling Resources:  ATP IV Guidelines  Pooled Cohorts Equation Calculator  FRAX Risk Assessment  ICSI Preventive Guidelines  Dietary Guidelines for Americans, 2010  USDA's MyPlate  ASA Prophylaxis  Lung CA Screening    Guero Flores MD  [facility]  "

## 2021-09-18 LAB
CHOLEST SERPL-MCNC: 164 MG/DL
FASTING STATUS PATIENT QL REPORTED: YES
FASTING STATUS PATIENT QL REPORTED: YES
GLUCOSE BLD-MCNC: 90 MG/DL (ref 70–99)
HDLC SERPL-MCNC: 77 MG/DL
LDLC SERPL CALC-MCNC: 76 MG/DL
NONHDLC SERPL-MCNC: 87 MG/DL
TRIGL SERPL-MCNC: 54 MG/DL

## 2021-10-19 PROBLEM — F32.9 MAJOR DEPRESSION: Status: ACTIVE | Noted: 2020-07-29

## 2022-09-28 ENCOUNTER — NURSE TRIAGE (OUTPATIENT)
Dept: NURSING | Facility: CLINIC | Age: 22
End: 2022-09-28

## 2022-09-28 NOTE — TELEPHONE ENCOUNTER
Patient calling requesting letter to make him exempt from receiving COVID 19 vaccine.   Reporting strong family history of blood clots.   Patient is reporting he has upcoming event on 10/21/22 and will need letter to attend.  Transferred to Central Scheduling to obtain Telephone Visit to discuss with provider.    Sudha Brunner RN  [facility]          Reason for Disposition    [1] Caller requests to speak ONLY to PCP AND [2] NON-URGENT question    Additional Information    Negative: Lab calling with strep throat test results and triager can call in prescription    Negative: Lab calling with urinalysis test results and triager can call in prescription    Negative: Medication questions    Negative: Medication renewal and refill questions    Negative: Pre-operative or pre-procedural questions    Negative: ED call to PCP (i.e., primary care provider; doctor, NP, or PA)    Negative: Doctor (or NP/PA) call to PCP    Negative: Call about patient who is currently hospitalized    Negative: Lab or radiology calling with CRITICAL test results    Negative: [1] Follow-up call from patient regarding patient's clinical status AND [2] information urgent    Negative: [1] Caller requests to speak ONLY to PCP AND [2] URGENT question    Negative: [1] Caller requests to speak to PCP now AND [2] won't tell us reason for call  (Exception: If 10 pm to 6 am, caller must first discuss reason for the call.)    Negative: Notification of hospital admission    Negative: Notification of death    Negative: Caller requesting lab results  (Exception: Routine or non-urgent lab result.)    Negative: Lab or radiology calling with test results    Negative: [1] Follow-up call from patient regarding patient's clinical status AND [2] information NON-URGENT    Protocols used: [facility]

## 2022-09-29 ENCOUNTER — VIRTUAL VISIT (OUTPATIENT)
Dept: INTERNAL MEDICINE | Facility: CLINIC | Age: 22
End: 2022-09-29
Payer: COMMERCIAL

## 2022-09-29 DIAGNOSIS — Z28.21 2019-NCOV VACCINATION DECLINED: Primary | ICD-10-CM

## 2022-09-29 PROCEDURE — 99212 OFFICE O/P EST SF 10 MIN: CPT | Mod: 95 | Performed by: NURSE PRACTITIONER

## 2022-09-29 RX ORDER — VIT C/B6/B5/MAGNESIUM/HERB 173 50-5-6-5MG
CAPSULE ORAL
COMMUNITY

## 2022-09-29 RX ORDER — OMEGA-3/DHA/EPA/FISH OIL 60 MG-90MG
CAPSULE ORAL
COMMUNITY

## 2022-09-29 NOTE — PROGRESS NOTES
"Kev is a 21 year old who is being evaluated via a billable telephone visit.      What phone number would you like to be contacted at? [phone]  How would you like to obtain your AVS? [facility]    Assessment & Plan     2019-nCoV vaccination declined        Discussed with pt he does not meet criteria for medical exemption from vaccine.  He asked for \"a one time exemption from the criteria\".  Orthodox exemption may apply, referred to MD if wishes to pursue this  End of conversation, pt reports he may seek J&J single vaccine in order to qualify to attend a week long event he wishes to attend  Advised booster recommended after vaccine.  Pt seemed satisfied with recommendations.             No follow-ups on file.    Mary Cha NP  [facility]    Subjective   Kev is a 21 year old, presenting for the following health issues:  Consult (Exemption for covid vaccine)      HPI     Pt seeking exemption from Covid vaccine due to FH blood clots and CAD  He wishes to attend a week long event where documentation of vaccines required or letter of exemption and negative Covid test.      Review of Systems   Constitutional, HEENT, cardiovascular, pulmonary, gi and gu systems are negative, except as otherwise noted.    History reviewed. No pertinent past medical history.    Current Outpatient Medications   Medication     Cholecalciferol (VITAMIN D3 PO)     fish oil-omega-3 fatty acids 500 MG capsule     Turmeric (QC TUMERIC COMPLEX) 500 MG CAPS     vitamin C (ASCORBIC ACID) 100 MG tablet     No current facility-administered medications for this visit.           Objective           Vitals:  No vitals were obtained today due to virtual visit.    Physical Exam     PSYCH: Alert and oriented times 3; coherent speech, normal   rate and volume, able to articulate logical thoughts, able   to abstract reason, no tangential thoughts, no hallucinations   or delusions  His affect is mildly " argumentative     RESP: No cough, no audible wheezing, able to talk in full sentences  Remainder of exam unable to be completed due to telephone visits                Phone call duration: 6 minutes

## 2022-10-07 ENCOUNTER — OFFICE VISIT (OUTPATIENT)
Dept: URGENT CARE | Facility: URGENT CARE | Age: 22
End: 2022-10-07
Payer: COMMERCIAL

## 2022-10-07 VITALS
RESPIRATION RATE: 16 BRPM | OXYGEN SATURATION: 98 % | SYSTOLIC BLOOD PRESSURE: 108 MMHG | HEART RATE: 67 BPM | TEMPERATURE: 97.8 F | DIASTOLIC BLOOD PRESSURE: 66 MMHG

## 2022-10-07 DIAGNOSIS — G44.209 ACUTE NON INTRACTABLE TENSION-TYPE HEADACHE: Primary | ICD-10-CM

## 2022-10-07 DIAGNOSIS — M54.2 NECK PAIN: ICD-10-CM

## 2022-10-07 PROCEDURE — 99212 OFFICE O/P EST SF 10 MIN: CPT | Performed by: PHYSICIAN ASSISTANT

## 2022-10-07 ASSESSMENT — ENCOUNTER SYMPTOMS
NAUSEA: 1
RHINORRHEA: 0
COUGH: 0
FEVER: 0

## 2022-10-07 NOTE — PROGRESS NOTES
Assessment & Plan:        ICD-10-CM    1. Acute non intractable tension-type headache  G44.209    2. Neck pain  M54.2          Plan/Clinical Decision Making:    Patient with covid vaccine a week ago, had side effects, mostly resolved, but lingering neck pain and headache. Patient has had some stress/anxiety with starting new job.   Normal exam. Can continue with ibuprofen and Tylenol as needed for pain.  Discussed avoiding overuse.  Can also try heat/ice and neck stretches.   His symptoms seem consistent with tension headache.       Return if symptoms worsen or fail to improve 3-5 days.     At the end of the encounter, I discussed results, diagnosis, medications. Discussed red flags for immediate return to clinic/ER, as well as indications for follow up if no improvement. Patient understood and agreed to plan. Patient was stable for discharge.        Taryn Hooper PA-C on 10/7/2022 at 2:06 PM          Subjective:     HPI:    Kev is a 21 year old male who presents to clinic today for the following health issues:  Chief Complaint   Patient presents with     Headache     8 days started after 1st dose covid vaccine, nausea     HPI    Patient complains of side effects from 1st dose of covid vaccine.   Initially body aches, dizziness, nausea and migraine.   Since then improving with symptoms, but still has lingering headache and neck pain. Headache located in posterior occipital area of head.   Has been taking ibuprofen which does help with symptoms.  Also taking Tylenol every 4 hours. Symptoms keeps coming back.   Patient started new job has some stress/anxiety with new job.     History obtained from the patient.    Review of Systems   Constitutional: Negative for fever.   HENT: Negative for congestion and rhinorrhea.    Eyes: Negative for visual disturbance.   Respiratory: Negative for cough.    Gastrointestinal: Positive for nausea (off and on).         Patient Active Problem List   Diagnosis     Scoliosis      Lordosis of thoracolumbar region     Mild major depression (H)     Anxiety        No past medical history on file.    Social History     Tobacco Use     Smoking status: Former Smoker     Quit date: 6/6/2019     Years since quitting: 3.3     Smokeless tobacco: Never Used     Tobacco comment: No one in family smokes.   Substance Use Topics     Alcohol use: Yes             Objective:     Vitals:    10/07/22 1349   BP: 108/66   BP Location: Right arm   Pulse: 67   Resp: 16   Temp: 97.8  F (36.6  C)   TempSrc: Tympanic   SpO2: 98%         Physical Exam   EXAM:   Pleasant, alert, appropriate appearance. NAD.  Head Exam: Normocephalic, atraumatic.  Eye Exam:  PERRLA, EOMI, non icteric/injection.    Ear Exam: TMs grey without bulging. Normal canals.  Normal pinna.  Nose Exam: Normal external nose.    OroPharynx Exam:  Moist mucous membranes. No erythema, pharynx without exudate or hypertrophy.  Neck/Thyroid Exam:  No LAD.  No nodules or enlargement.  Chest/Respiratory Exam: CTAB.  Cardiovascular Exam: RRR. No murmur or rubs.  Neck: no pain on palpation, good ROM  Neuro: CN II-XII intact grossly intact.  Skin: no rash or lesion.      Results:  No results found for any visits on 10/07/22.

## 2022-10-23 ENCOUNTER — HEALTH MAINTENANCE LETTER (OUTPATIENT)
Age: 22
End: 2022-10-23

## 2022-12-08 ENCOUNTER — OFFICE VISIT (OUTPATIENT)
Dept: INTERNAL MEDICINE | Facility: CLINIC | Age: 22
End: 2022-12-08
Payer: COMMERCIAL

## 2022-12-08 VITALS
HEIGHT: 72 IN | BODY MASS INDEX: 18.83 KG/M2 | TEMPERATURE: 98.4 F | OXYGEN SATURATION: 97 % | RESPIRATION RATE: 14 BRPM | DIASTOLIC BLOOD PRESSURE: 62 MMHG | WEIGHT: 139 LBS | HEART RATE: 71 BPM | SYSTOLIC BLOOD PRESSURE: 106 MMHG

## 2022-12-08 DIAGNOSIS — K21.00 GASTROESOPHAGEAL REFLUX DISEASE WITH ESOPHAGITIS WITHOUT HEMORRHAGE: ICD-10-CM

## 2022-12-08 DIAGNOSIS — Z11.59 NEED FOR HEPATITIS C SCREENING TEST: ICD-10-CM

## 2022-12-08 DIAGNOSIS — Z00.00 ROUTINE GENERAL MEDICAL EXAMINATION AT A HEALTH CARE FACILITY: Primary | ICD-10-CM

## 2022-12-08 DIAGNOSIS — Z11.4 SCREENING FOR HIV (HUMAN IMMUNODEFICIENCY VIRUS): ICD-10-CM

## 2022-12-08 LAB
BASOPHILS # BLD AUTO: 0 10E3/UL (ref 0–0.2)
BASOPHILS NFR BLD AUTO: 0 %
EOSINOPHIL # BLD AUTO: 0.1 10E3/UL (ref 0–0.7)
EOSINOPHIL NFR BLD AUTO: 1 %
ERYTHROCYTE [DISTWIDTH] IN BLOOD BY AUTOMATED COUNT: 11.6 % (ref 10–15)
HCT VFR BLD AUTO: 44.9 % (ref 40–53)
HGB BLD-MCNC: 15.6 G/DL (ref 13.3–17.7)
IMM GRANULOCYTES # BLD: 0 10E3/UL
IMM GRANULOCYTES NFR BLD: 0 %
LYMPHOCYTES # BLD AUTO: 2.3 10E3/UL (ref 0.8–5.3)
LYMPHOCYTES NFR BLD AUTO: 29 %
MCH RBC QN AUTO: 30.1 PG (ref 26.5–33)
MCHC RBC AUTO-ENTMCNC: 34.7 G/DL (ref 31.5–36.5)
MCV RBC AUTO: 87 FL (ref 78–100)
MONOCYTES # BLD AUTO: 0.6 10E3/UL (ref 0–1.3)
MONOCYTES NFR BLD AUTO: 7 %
NEUTROPHILS # BLD AUTO: 4.8 10E3/UL (ref 1.6–8.3)
NEUTROPHILS NFR BLD AUTO: 61 %
PLATELET # BLD AUTO: 193 10E3/UL (ref 150–450)
RBC # BLD AUTO: 5.19 10E6/UL (ref 4.4–5.9)
WBC # BLD AUTO: 7.8 10E3/UL (ref 4–11)

## 2022-12-08 PROCEDURE — 87389 HIV-1 AG W/HIV-1&-2 AB AG IA: CPT | Performed by: NURSE PRACTITIONER

## 2022-12-08 PROCEDURE — 86803 HEPATITIS C AB TEST: CPT | Performed by: NURSE PRACTITIONER

## 2022-12-08 PROCEDURE — 80050 GENERAL HEALTH PANEL: CPT | Performed by: NURSE PRACTITIONER

## 2022-12-08 PROCEDURE — 99395 PREV VISIT EST AGE 18-39: CPT | Performed by: NURSE PRACTITIONER

## 2022-12-08 PROCEDURE — 80061 LIPID PANEL: CPT | Performed by: NURSE PRACTITIONER

## 2022-12-08 PROCEDURE — 36415 COLL VENOUS BLD VENIPUNCTURE: CPT | Performed by: NURSE PRACTITIONER

## 2022-12-08 ASSESSMENT — ENCOUNTER SYMPTOMS
HEADACHES: 1
FEVER: 0
PARESTHESIAS: 0
CONSTIPATION: 0
SORE THROAT: 0
HEMATURIA: 0
CHILLS: 0
ARTHRALGIAS: 0
MYALGIAS: 0
SHORTNESS OF BREATH: 0
JOINT SWELLING: 0
FREQUENCY: 1
COUGH: 0
DYSURIA: 0
EYE PAIN: 0
HEARTBURN: 1
HEMATOCHEZIA: 0
NAUSEA: 0
DIARRHEA: 0
WEAKNESS: 0
PALPITATIONS: 0
DIZZINESS: 0
NERVOUS/ANXIOUS: 1
ABDOMINAL PAIN: 0

## 2022-12-08 ASSESSMENT — PATIENT HEALTH QUESTIONNAIRE - PHQ9
10. IF YOU CHECKED OFF ANY PROBLEMS, HOW DIFFICULT HAVE THESE PROBLEMS MADE IT FOR YOU TO DO YOUR WORK, TAKE CARE OF THINGS AT HOME, OR GET ALONG WITH OTHER PEOPLE: SOMEWHAT DIFFICULT
SUM OF ALL RESPONSES TO PHQ QUESTIONS 1-9: 7
SUM OF ALL RESPONSES TO PHQ QUESTIONS 1-9: 7

## 2022-12-08 NOTE — PROGRESS NOTES
SUBJECTIVE:   CC: Kev is an 22 year old who presents for preventative health visit.   Healthy Habits:     Getting at least 3 servings of Calcium per day:  NO    Bi-annual eye exam:  Yes    Dental care twice a year:  Yes    Sleep apnea or symptoms of sleep apnea:  None    Diet:  Regular (no restrictions)    Frequency of exercise:  2-3 days/week    Duration of exercise:  45-60 minutes    Taking medications regularly:  Yes    Medication side effects:  Not applicable    PHQ-2 Total Score: 2    Additional concerns today:  No              Today's PHQ-2 Score:   PHQ-2 ( 1999 [employer]) 12/8/2022   Q1: Little interest or pleasure in doing things 1   Q2: Feeling down, depressed or hopeless 1   PHQ-2 Score 2   PHQ-2 Total Score (12-17 Years)- Positive if 3 or more points; Administer PHQ-A if positive -   Q1: Little interest or pleasure in doing things Several days   Q2: Feeling down, depressed or hopeless Several days   PHQ-2 Score 2           Social History     Tobacco Use     Smoking status: Former     Types: Cigarettes     Quit date: 6/6/2019     Years since quitting: 3.5     Smokeless tobacco: Never     Tobacco comments:     No one in family smokes.   Substance Use Topics     Alcohol use: Yes         Alcohol Use 12/8/2022   Prescreen: >3 drinks/day or >7 drinks/week? No   Prescreen: >3 drinks/day or >7 drinks/week? -   AUDIT SCORE  -       Last PSA: No results found for: PSA    Reviewed orders with patient. Reviewed health maintenance and updated orders accordingly - Yes      Reviewed and updated as needed this visit by clinical staff   Tobacco  Allergies  Meds  Problems  Med Hx  Surg Hx  Fam Hx          Reviewed and updated as needed this visit by Provider    Allergies                  Review of Systems   Constitutional: Negative for chills and fever.   HENT: Negative for congestion, ear pain, hearing loss and sore throat.    Eyes: Negative for pain and visual disturbance.   Respiratory: Negative for cough and  "shortness of breath.    Cardiovascular: Negative for chest pain, palpitations and peripheral edema.   Gastrointestinal: Positive for heartburn. Negative for abdominal pain, constipation, diarrhea, hematochezia and nausea.   Genitourinary: Positive for frequency. Negative for dysuria, genital sores, hematuria and urgency.   Musculoskeletal: Negative for arthralgias, joint swelling and myalgias.   Skin: Negative for rash.   Neurological: Positive for headaches. Negative for dizziness, weakness and paresthesias.   Psychiatric/Behavioral: Negative for mood changes. The patient is nervous/anxious.      Fasting for lab     Heartburn- does not take any medication for it   Constant burping even when not eating     Headache - tylenol or ibuprofen as needed and helps     Frequency at night -will watch     He has some palpitation or skipped beats - more around covid shot   Now has been ok   Discussed if more prevalent could consider holter     Declined STD testing     OBJECTIVE:   /62   Pulse 71   Temp 98.4  F (36.9  C) (Oral)   Resp 14   Ht 1.83 m (6' 0.05\")   Wt 63 kg (139 lb)   SpO2 97%   BMI 18.83 kg/m      Physical Exam  GENERAL: alert and no distress  EYES: Eyes grossly normal to inspection,  and conjunctivae and sclerae normal  HENT: ear canals and TM's normal, nose and mouth without ulcers or lesions  NECK: no adenopathy, no asymmetry, masses, or scars and thyroid normal to palpation  RESP: lungs clear to auscultation - no rales, rhonchi or wheezes  CV: regular rate and rhythm, normal S1 S2, no S3 or S4, no murmur, click or rub, no peripheral edema and peripheral pulses strong  ABDOMEN: soft, nontender, no hepatosplenomegaly, no masses and bowel sounds normal  MS: no gross musculoskeletal defects noted, no edema  SKIN: no suspicious lesions or rashes  NEURO: Normal strength and tone, mentation intact and speech normal  PSYCH: mentation appears normal, affect normal/bright    Diagnostic Test Results:  Labs " reviewed in Epic  Lab     ASSESSMENT/PLAN:   (Z00.00) Routine general medical examination at a health care facility  (primary encounter diagnosis)  Comment: declined flu ot TDAP   Plan: Lipid panel reflex to direct LDL Fasting,         Comprehensive metabolic panel (BMP + Alb, Alk         Phos, ALT, AST, Total. Bili, TP), TSH with free        T4 reflex, CBC with platelets and differential            (Z11.4) Screening for HIV (human immunodeficiency virus)  Comment:   Plan: HIV Antigen Antibody Combo            (Z11.59) Need for hepatitis C screening test  Comment:   Plan: Hepatitis C Screen Reflex to HCV RNA Quant and         Genotype          GERD   Suggested pepcid trial               COUNSELING:   Reviewed preventive health counseling, as reflected in patient instructions       Regular exercise       Healthy diet/nutrition       Osteoporosis prevention/bone health        He reports that he quit smoking about 3 years ago. He has never used smokeless tobacco.        MARIE Rosas [facility]  Answers for HPI/ROS submitted by the patient on 12/8/2022  If you checked off any problems, how difficult have these problems made it for you to do your work, take care of things at home, or get along with other people?: Somewhat difficult  PHQ9 TOTAL SCORE: 7

## 2022-12-09 LAB
ALBUMIN SERPL BCG-MCNC: 4.7 G/DL (ref 3.5–5.2)
ALP SERPL-CCNC: 78 U/L (ref 40–129)
ALT SERPL W P-5'-P-CCNC: 23 U/L (ref 10–50)
ANION GAP SERPL CALCULATED.3IONS-SCNC: 13 MMOL/L (ref 7–15)
AST SERPL W P-5'-P-CCNC: 23 U/L (ref 10–50)
BILIRUB SERPL-MCNC: 0.6 MG/DL
BUN SERPL-MCNC: 15.2 MG/DL (ref 6–20)
CALCIUM SERPL-MCNC: 9.2 MG/DL (ref 8.6–10)
CHLORIDE SERPL-SCNC: 102 MMOL/L (ref 98–107)
CHOLEST SERPL-MCNC: 174 MG/DL
CREAT SERPL-MCNC: 0.85 MG/DL (ref 0.67–1.17)
DEPRECATED HCO3 PLAS-SCNC: 26 MMOL/L (ref 22–29)
GFR SERPL CREATININE-BSD FRML MDRD: >90 ML/MIN/1.73M2
GLUCOSE SERPL-MCNC: 68 MG/DL (ref 70–99)
HCV AB SERPL QL IA: NONREACTIVE
HDLC SERPL-MCNC: 70 MG/DL
HIV 1+2 AB+HIV1 P24 AG SERPL QL IA: NONREACTIVE
LDLC SERPL CALC-MCNC: 88 MG/DL
NONHDLC SERPL-MCNC: 104 MG/DL
POTASSIUM SERPL-SCNC: 3.7 MMOL/L (ref 3.4–5.3)
PROT SERPL-MCNC: 7.2 G/DL (ref 6.4–8.3)
SODIUM SERPL-SCNC: 141 MMOL/L (ref 136–145)
TRIGL SERPL-MCNC: 80 MG/DL
TSH SERPL DL<=0.005 MIU/L-ACNC: 1.01 UIU/ML (ref 0.3–4.2)

## 2024-01-14 ENCOUNTER — HEALTH MAINTENANCE LETTER (OUTPATIENT)
Age: 24
End: 2024-01-14

## 2025-01-26 ENCOUNTER — HEALTH MAINTENANCE LETTER (OUTPATIENT)
Age: 25
End: 2025-01-26